# Patient Record
Sex: FEMALE | Race: OTHER | HISPANIC OR LATINO | Employment: STUDENT | URBAN - METROPOLITAN AREA
[De-identification: names, ages, dates, MRNs, and addresses within clinical notes are randomized per-mention and may not be internally consistent; named-entity substitution may affect disease eponyms.]

---
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
## 2024-01-11 ENCOUNTER — SOCIAL WORK (OUTPATIENT)
Dept: PSYCHIATRY | Facility: CLINIC | Age: 19
End: 2024-01-11
Payer: COMMERCIAL

## 2024-01-11 DIAGNOSIS — F41.1 GENERALIZED ANXIETY DISORDER: Primary | ICD-10-CM

## 2024-01-11 PROCEDURE — 90834 PSYTX W PT 45 MINUTES: CPT

## 2024-01-11 NOTE — PSYCH
Behavioral Health Psychotherapy Progress Note    Psychotherapy Provided: Individual Psychotherapy     1. Generalized anxiety disorder            Goals addressed in session: Goal 1     DATA: Monica's brother brought her to her session.She is animated and expressive. She ran out of her Sertraline for two weeks and had some very definite side effects. She has her medication refilled now and is taking it consistently. She has been at home for several weeks. Goes back to college on 1/21. The cleanliness and orderliness of the family house caused her a lot of anxiety. She talked to her mother about it but Monica doesn't think that she understood. Still upset about not getting the piano lessons she needs to excel at her [employer]. Discussed ways that she could speak to her professor about it. She got some C's this past semester. She admits that she didn't give her full effort. Discussed how she does the best she can with what she has. Needs to give herself some conchita. Talked about wishing she would have applied to more colleges. At the time she was afraid about even going to school and didn't think that she would have any other options. 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 Talked about getting another tattoo: it's pain that she can control. [employer] have her information to [employer] and now they are interested in doing a story about her. Scheduled monthly appointments.  During this session, this clinician used the following therapeutic modalities: Client-centered Therapy and Supportive Psychotherapy    Substance Abuse was not addressed during this session. If the client is diagnosed with a co-occurring substance use disorder, please indicate any changes in the frequency or amount of use: N/A. Stage of change for addressing substance use diagnoses: No substance use/Not  "applicable    ASSESSMENT:  Monica Townsend presents with a Anxious mood.     her affect is Normal range and intensity, which is congruent, with her mood and the content of the session. The client has made progress on their goals.     Monica Townsend presents with a none risk of suicide, none risk of self-harm, and none risk of harm to others.    For any risk assessment that surpasses a \"low\" rating, a safety plan must be developed.    A safety plan was indicated: no  If yes, describe in detail Monica mathewies current HI/SI/SIB    PLAN: Between sessions, Monica Townsend will continue to attend college and do her best. Await call from transplant coordinator. At the next session, the therapist will use Client-centered Therapy and Supportive Psychotherapy to address anxiety, living with a chronic illness and plans for her future.    Behavioral Health Treatment Plan and Discharge Planning: Monica Townsend is aware of and agrees to continue to work on their treatment plan. They have identified and are working toward their discharge goals. yes    Visit start and stop times:    01/11/24  Start Time: 1104  Stop Time: 1150  Total Visit Time: 46 minutes  "

## 2024-01-11 NOTE — BH TREATMENT PLAN
Outpatient Behavioral Health Psychotherapy Treatment Plan    Monicaciaran Townsend  2005     Date of Initial Psychotherapy Assessment: 5/11/2022   Date of Current Treatment Plan: 01/11/24  Treatment Plan Target Date: 7/11/2024  Treatment Plan Expiration Date: 7/11/2024    Diagnosis:   1. Generalized anxiety disorder            Area(s) of Need: self-esteem, anxiety, living with a chronic illness    Long Term Goal 1 (in the client's own words): be less anxious    Stage of Change: Contemplation    Target Date for completion: 7/11/2024     Anticipated therapeutic modalities: client centered, supportive and CBT     People identified to complete this goal: therapist and patient      Objective 1: (identify the means of measuring success in meeting the objective): Remain engaged in a therapeutic relationship      Objective 2: (identify the means of measuring success in meeting the objective): Continue to learn and utilize coping skills that can decrease her anxiety      Long Term Goal 2 (in the client's own words): accept my worth    Stage of Change: Contemplation    Target Date for completion: 7/11/2024     Anticipated therapeutic modalities: client centered, supportive and CBT     People identified to complete this goal: therapist and patient      Objective 1: (identify the means of measuring success in meeting the objective): Remain engaged in therapeutic relationship      Objective 2: (identify the means of measuring success in meeting the objective): Talk about and process her history and experiences that make her doubt her worth. Reframe to positive thoughts.      I am currently under the care of a [facility] psychiatric provider: yes    My [facility] psychiatric provider is: Dr. Echevarria    I am currently taking psychiatric medications: Yes, as prescribed    I feel that I will be ready for discharge from mental health care when I reach the following (measurable goal/objective): Have my kidney transplant, recover and  get back to school.    For children and adults who have a legal guardian:   Has there been any change to custody orders and/or guardianship status? No. If yes, attach updated documentation.    I have not yet created my Crisis Plan and will be offered a copy of this plan    Behavioral Health Treatment Plan [facility]: Diagnosis and Treatment Plan explained to Moncia Townsend acknowledges an understanding of their diagnosis. Monica Townsend agrees to this treatment plan.    I have been offered a copy of this Treatment Plan. yes

## 2024-01-29 DIAGNOSIS — I10 PRIMARY HYPERTENSION: ICD-10-CM

## 2024-01-29 DIAGNOSIS — N25.81 SECONDARY HYPERPARATHYROIDISM (HCC): Primary | ICD-10-CM

## 2024-01-29 RX ORDER — CALCITRIOL 0.25 UG/1
0.25 CAPSULE, LIQUID FILLED ORAL DAILY
Qty: 90 CAPSULE | Refills: 3 | Status: SHIPPED | OUTPATIENT
Start: 2024-01-29 | End: 2025-01-23

## 2024-01-29 RX ORDER — LOSARTAN POTASSIUM 25 MG/1
25 TABLET ORAL DAILY
Qty: 90 TABLET | Refills: 3 | Status: SHIPPED | OUTPATIENT
Start: 2024-01-29 | End: 2025-01-23

## 2024-02-01 ENCOUNTER — OFFICE VISIT (OUTPATIENT)
Dept: PSYCHIATRY | Facility: CLINIC | Age: 19
End: 2024-02-01

## 2024-02-01 DIAGNOSIS — F32.0 MDD (MAJOR DEPRESSIVE DISORDER), SINGLE EPISODE, MILD (HCC): ICD-10-CM

## 2024-02-01 DIAGNOSIS — F41.1 GENERALIZED ANXIETY DISORDER: ICD-10-CM

## 2024-02-01 RX ORDER — SERTRALINE HYDROCHLORIDE 100 MG/1
100 TABLET, FILM COATED ORAL DAILY
Qty: 90 TABLET | Refills: 3 | Status: SHIPPED | OUTPATIENT
Start: 2024-02-01

## 2024-02-01 NOTE — BH TREATMENT PLAN
TREATMENT PLAN (Medication Management Only)        [facility]    Name and Date of Birth:  Monica Towsnend 18 y.o. 2005  Date of Treatment Plan: February 1, 2024  Diagnosis/Diagnoses:    1. Generalized anxiety disorder    2. MDD (major depressive disorder), single episode, mild (HCC)      Strengths/Personal Resources for Self-Care: supportive family, supportive friends, taking medications as prescribed, ability to adapt to life changes, ability to communicate needs, ability to communicate well, ability to listen, ability to reason, ability to understand psychiatric illness, average or above intelligence.  Area/Areas of need (in own words): anxiety, depression  1. Long Term Goal: Feel calmer.  Target Date:6 months - 8/1/2024  Person/Persons responsible for completion of goal: Monica  2.  Short Term Objective (s) - How will we reach this goal?:   A. Provider new recommended medication/dosage changes and/or continue medication(s): continue current medications as prescribed.  B. N/A.  C. N/A.  Target Date:6 months - 8/1/2024  Person/Persons Responsible for Completion of Goal: Monica  Progress Towards Goals: continuing treatment  Treatment Modality: medication management every 6 months  Review due 180 days from date of this plan: 6 months - 8/1/2024  Expected length of service: ongoing treatment  My Physician/PA/NP and I have developed this plan together and I agree to work on the goals and objectives. I understand the treatment goals that were developed for my treatment.

## 2024-02-01 NOTE — PSYCH
MEDICATION MANAGEMENT NOTE        [facility]      Name and Date of Birth:  Monica Townsend 18 y.o. 2005 MRN: [id number]    Date of Visit: February 1, 2024    Reason for Visit: Follow-up for medication management.    Subjective: The patient reports she overall has been doing well since she last saw me in August.  Reports mood has been very stable and denies any depression or anxiety.  The patient reported in December she ran out of the medication and was not able to get the refill for 1 to 2 weeks and was without the meds.  At that time she reported having withdrawals including dizziness and increase in anxiety but once she got back on the medication she did well again.  She denies any concerns since that episode.  Reports her sleep, appetite, energy level and concentration has been fine.  Denies any hopelessness or SI or HI.  Denies any panic attacks lately.  Reports compliant with Zoloft and denies any side effect.  The patient still continues to do peritoneal dialysis every night.  Currently awaiting for kidney transplantation and is on the list.  She though denies any other medical issues.  Denies any other concerns today.  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      Review Of Systems:      Constitutional negative   ENT negative   Cardiovascular negative   Respiratory negative   Gastrointestinal negative   Genitourinary negative   Musculoskeletal negative   Integumentary negative   Neurological negative   Endocrine negative   Other Symptoms none       Alcohol/Substance Abuse: Denies.        Past Medical History:    Past Medical History:   Diagnosis Date    Alport syndrome     Asthma     Chronic kidney disease 01/2020    Encounter for routine child health examination without abnormal findings 2/20/2018    Hearing screen passed 2/20/2018    Lump of axilla, left 5/3/2018    Peritoneal dialysis catheter dysfunction, initial  encounter (HCC)         Past Surgical History:   Procedure Laterality Date    IR NEPHROSTOMY TO NEPHROURETERAL STENT  12/29/2021    IR PERITONEAL DIALYSIS CATHETER CHECK OR REPOSITION  1/12/2022    IR PERITONEAL DIALYSIS CATHETER CHECK OR REPOSITION  3/16/2022    IR PERITONEAL DIALYSIS CATHETER CHECK OR REPOSITION  6/1/2022    IR PERITONEAL DIALYSIS CATHETER CHECK OR REPOSITION  6/22/2022    IR PERITONEAL DIALYSIS CATHETER CHECK OR REPOSITION  12/6/2022    IR PERITONEAL DIALYSIS CATHETER PLACEMENT  12/10/2021    AZ LAPS W/REVISION INTRAPERITONEAL CATHETER N/A 1/19/2022    Procedure: INSERTION PERITONEAL CATHETER DIALYSIS LAPAROSCOPIC (manipulation of);  Surgeon: Josue Oliveira MD;  [facility];  Service: General    AZ RENAL BIOPSY PRQ TROCAR/NEEDLE Left 8/3/2020    Procedure: PERCUTANEOUS RENAL BIOPSY (US GUIDED);  Surgeon: Helder Osman MD;  Location: [facility];  Service: Urology    AZ RPR UMBILICAL HRNA 5 YRS/> REDUCIBLE N/A 1/19/2022    Procedure: REPAIR HERNIA UMBILICAL;  Surgeon: Josue Oliveira MD;  [facility];  Service: General    AZ UNLISTED PROCEDURE ABDOMEN PERITONEUM & OMENTUM N/A 3/19/2022    Procedure: REVISION/ UNROOFING PERITONEAL CATHETER DIALYSIS  LAPAROSCOPIC;  Surgeon: Denia King MD;  Location: [facility];  Service: General    US GUIDANCE  8/3/2020    WISDOM TOOTH EXTRACTION       Allergies   Allergen Reactions    Vancomycin Itching       Current Medications:       Current Outpatient Medications:     sertraline (ZOLOFT) 100 mg tablet, Take 1 tablet (100 mg total) by mouth daily, Disp: 90 tablet, Rfl: 3    acetaminophen (TYLENOL) 325 mg tablet, Take 650 mg by mouth every 6 (six) hours as needed for mild pain , Disp: , Rfl:     albuterol (PROVENTIL HFA,VENTOLIN HFA) 90 mcg/act inhaler, TAKE 2 PUFFS BY MOUTH EVERY 6 HOURS AS NEEDED FOR WHEEZE, Disp: 18 Inhaler, Rfl: 0    calcitriol (ROCALTROL) 0.25 mcg capsule, Take 1 capsule (0.25 mcg total) by mouth daily, Disp: 90  capsule, Rfl: 3    calcium carbonate (TUMS) 500 mg chewable tablet, Chew 1 tablet (500 mg total) 3 (three) times a day before meals, Disp: 90 tablet, Rfl: 5    clotrimazole-betamethasone (LOTRISONE) 1-0.05 % cream, APPLY TO AFFECTED AREA TWICE A DAY, Disp: 30 g, Rfl: 0    ferrous sulfate 324 (65 Fe) mg, Take 1 tablet (324 mg total) by mouth in the morning and 1 tablet (324 mg total) in the evening. Take before meals., Disp: 60 tablet, Rfl: 5    gentamicin (GARAMYCIN) 0.1 % cream, Apply topically daily Use as directed to PD site, Disp: 15 g, Rfl: 6    losartan (COZAAR) 25 mg tablet, Take 1 tablet (25 mg total) by mouth daily, Disp: 90 tablet, Rfl: 3    norelgestromin-ethinyl estradiol (Xulane) 150-35 MCG/24HR, Place 1 patch on skin. Change weekly for 3 weeks, then leave off for 1 week., Disp: 9 patch, Rfl: 3    ondansetron (ZOFRAN-ODT) 4 mg disintegrating tablet, Take 1 tablet (4 mg total) by mouth every 6 (six) hours as needed for nausea or vomiting, Disp: 30 tablet, Rfl: 1    polyethylene glycol (GLYCOLAX) 17 GM/SCOOP powder, Take by mouth, Disp: , Rfl:     sorbitol 70 % solution, Take 30 mL by mouth daily as needed (as needed for constipation and catheter dysfunction) for up to 10 days, Disp: 150 mL, Rfl: 1       History Review: The following portions of the patient's history were reviewed and updated as appropriate: allergies, current medications, past family history, past medical history, past social history, past surgical history, and problem list.         OBJECTIVE:     Vital signs in last 24 hours:    There were no vitals filed for this visit.    Mental Status Evaluation:    Appearance age appropriate, casually dressed   Behavior cooperative, calm   Speech normal rate, normal volume, normal pitch   Mood normal   Affect normal range and intensity, appropriate   Thought Processes organized, goal directed   Associations intact associations   Thought Content no overt delusions   Perceptual Disturbances: no  auditory hallucinations, no visual hallucinations   Abnormal Thoughts  Risk Potential Suicidal ideation - None  Homicidal ideation - None  Potential for aggression - No   Orientation oriented to person, place, time/date, and situation   Memory recent and remote memory grossly intact   Consciousness alert and awake   Attention Span Concentration Span attention span and concentration are age appropriate   Intellect appears to be of average intelligence   Insight intact   Judgement intact   Muscle Strength and  Gait normal gait and normal balance   Motor activity no abnormal movements   Language no difficulty naming common objects, no difficulty repeating a phrase   Fund of Knowledge adequate knowledge of current events  adequate fund of knowledge regarding past history  adequate fund of knowledge regarding vocabulary    Pain none   Pain Scale 0       Laboratory Results: Most Recent Labs:   Lab Results   Component Value Date    WBC 5.50 07/07/2023    RBC 3.65 (L) 07/07/2023    HGB 11.0 (L) 07/07/2023    HCT 34.4 (L) 07/07/2023     07/07/2023    RDW 12.4 07/07/2023    NEUTROABS 2.81 07/07/2023    SODIUM 136 06/28/2023    K 4.4 06/28/2023     06/28/2023    CO2 23 06/28/2023    BUN 74 (H) 06/28/2023    CREATININE 8.24 (H) 06/28/2023    GLUCOSE 86 01/19/2022    CALCIUM 9.1 06/28/2023    AST 5 06/25/2023    ALT 11 (L) 06/25/2023    ALKPHOS 108 06/25/2023    TP 6.8 06/25/2023    TBILI 0.37 06/25/2023    FDP9SEHBRESY 1.030 05/20/2021    FREET4 0.82 01/23/2018    PREGSERUM Negative 12/26/2022    HCGQUANT <2 06/19/2020     I have personally reviewed all pertinent laboratory/tests results.    Assessment/Plan: The patient overall doing well and denies any concern.  The plan is to continue with the current medication with no changes.  The patient educated about the Zoloft in detail and advised to call us if there is any concern and to call crisis, 911 or visit nearby ER in case of any emergency or having SI.  The  patient verbalized understanding agrees with the plan.  She will follow up with me in 6 to 8 months or sooner if needed.      Diagnoses and all orders for this visit:    Generalized anxiety disorder  -     sertraline (ZOLOFT) 100 mg tablet; Take 1 tablet (100 mg total) by mouth daily    MDD (major depressive disorder), single episode, mild (HCC)  -     sertraline (ZOLOFT) 100 mg tablet; Take 1 tablet (100 mg total) by mouth daily          Treatment Recommendations/Precautions:      Aware of 24 hour and weekend coverage for urgent situations accessed by calling [facility] main practice number    Risks/Benefits      Risks, Benefits And Possible Side Effects Of Medications:    Risks, benefits, and possible side effects of medications explained to Monica and she verbalizes understanding and agreement for treatment.  Risks of medications in pregnancy explained to Monica. She verbalizes understanding and agrees to notify her doctor if she becomes pregnant.    Controlled Medication Discussion:     Not applicable    Psychotherapy Provided:     Individual psychotherapy provided: Medications, treatment progress and treatment plan reviewed with Monica.  Medication education provided to Monica.  Reassurance and supportive therapy provided.   Crisis/safety plan discussed with Monica.     Treatment Plan;    Completed and signed during the session: Yes - with Monica Echevarria MD 02/01/24

## 2024-02-08 ENCOUNTER — TELEMEDICINE (OUTPATIENT)
Dept: PSYCHIATRY | Facility: CLINIC | Age: 19
End: 2024-02-08

## 2024-02-08 DIAGNOSIS — F41.1 GENERALIZED ANXIETY DISORDER: Primary | ICD-10-CM
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
## 2024-02-08 NOTE — PSYCH
Virtual Regular Visit    Verification of patient location:    Patient is located at Home in the following state in which I hold an active license [place]      Assessment/Plan:    Problem List Items Addressed This Visit       Generalized anxiety disorder - Primary       Goals addressed in session: Goal 1          Reason for visit is   Chief Complaint   Patient presents with    Virtual Regular Visit          Encounter provider Shannon Mancilla LCSW    Provider located at [facility] [address] [place] [address] [phone]      Recent Visits  No visits were found meeting these conditions.  Showing recent visits within past 7 days and meeting all other requirements  Today's Visits  Date Type Provider Dept   02/08/24 Telemedicine Shannon Mancilla LCSW [facility] [place]   Showing today's visits and meeting all other requirements  Future Appointments  No visits were found meeting these conditions.  Showing future appointments within next 150 days and meeting all other requirements       The patient was identified by name and date of birth. Monica Townsend was informed that this is a telemedicine visit and that the visit is being conducted throughthe Microsoft Teams platform. She agrees to proceed..  My office door was closed. No one else was in the room.  She acknowledged consent and understanding of privacy and security of the video platform. The patient has agreed to participate and understands they can discontinue the visit at any time.    Patient is aware this is a billable service.     Subjective  Monica Townsend is a 18 y.o. female.      HPI     Past Medical History:   Diagnosis Date    Alport syndrome     Asthma     Chronic kidney disease 01/2020    Encounter for routine child health examination without abnormal findings 2/20/2018    Hearing screen  passed 2/20/2018    Lump of axilla, left 5/3/2018    Peritoneal dialysis catheter dysfunction, initial encounter ([facility])        Past Surgical History:   Procedure Laterality Date    IR NEPHROSTOMY TO NEPHROURETERAL STENT  12/29/2021    IR PERITONEAL DIALYSIS CATHETER CHECK OR REPOSITION  1/12/2022    IR PERITONEAL DIALYSIS CATHETER CHECK OR REPOSITION  3/16/2022    IR PERITONEAL DIALYSIS CATHETER CHECK OR REPOSITION  6/1/2022    IR PERITONEAL DIALYSIS CATHETER CHECK OR REPOSITION  6/22/2022    IR PERITONEAL DIALYSIS CATHETER CHECK OR REPOSITION  12/6/2022    IR PERITONEAL DIALYSIS CATHETER PLACEMENT  12/10/2021    MI LAPS W/REVISION INTRAPERITONEAL CATHETER N/A 1/19/2022    Procedure: INSERTION PERITONEAL CATHETER DIALYSIS LAPAROSCOPIC (manipulation of);  Surgeon: Josue Oliveira MD;  [facility];  Service: General    MI RENAL BIOPSY PRQ TROCAR/NEEDLE Left 8/3/2020    Procedure: PERCUTANEOUS RENAL BIOPSY (US GUIDED);  Surgeon: Helder Osman MD;  Location: [facility];  Service: Urology    MI RPR UMBILICAL HRNA 5 YRS/> REDUCIBLE N/A 1/19/2022    Procedure: REPAIR HERNIA UMBILICAL;  Surgeon: Josue Oliveira MD;  [facility]: [facility];  Service: General    MI UNLISTED PROCEDURE ABDOMEN PERITONEUM & OMENTUM N/A 3/19/2022    Procedure: REVISION/ UNROOFING PERITONEAL CATHETER DIALYSIS  LAPAROSCOPIC;  Surgeon: Denia King MD;  Location: [facility];  Service: General    US GUIDANCE  8/3/2020    WISDOM TOOTH EXTRACTION         Current Outpatient Medications   Medication Sig Dispense Refill    acetaminophen (TYLENOL) 325 mg tablet Take 650 mg by mouth every 6 (six) hours as needed for mild pain       albuterol (PROVENTIL HFA,VENTOLIN HFA) 90 mcg/act inhaler TAKE 2 PUFFS BY MOUTH EVERY 6 HOURS AS NEEDED FOR WHEEZE 18 Inhaler 0    calcitriol (ROCALTROL) 0.25 mcg capsule Take 1 capsule (0.25 mcg total) by mouth daily 90 capsule 3    calcium carbonate (TUMS) 500 mg chewable tablet Chew 1 tablet (500 mg total) 3  (three) times a day before meals 90 tablet 5    clotrimazole-betamethasone (LOTRISONE) 1-0.05 % cream APPLY TO AFFECTED AREA TWICE A DAY 30 g 0    ferrous sulfate 324 (65 Fe) mg Take 1 tablet (324 mg total) by mouth in the morning and 1 tablet (324 mg total) in the evening. Take before meals. 60 tablet 5    gentamicin (GARAMYCIN) 0.1 % cream Apply topically daily Use as directed to PD site 15 g 6    losartan (COZAAR) 25 mg tablet Take 1 tablet (25 mg total) by mouth daily 90 tablet 3    norelgestromin-ethinyl estradiol (Xulane) 150-35 MCG/24HR Place 1 patch on skin. Change weekly for 3 weeks, then leave off for 1 week. 9 patch 3    ondansetron (ZOFRAN-ODT) 4 mg disintegrating tablet Take 1 tablet (4 mg total) by mouth every 6 (six) hours as needed for nausea or vomiting 30 tablet 1    polyethylene glycol (GLYCOLAX) 17 GM/SCOOP powder Take by mouth      sertraline (ZOLOFT) 100 mg tablet Take 1 tablet (100 mg total) by mouth daily 90 tablet 3    sorbitol 70 % solution Take 30 mL by mouth daily as needed (as needed for constipation and catheter dysfunction) for up to 10 days 150 mL 1     No current facility-administered medications for this visit.        Allergies   Allergen Reactions    Vancomycin Itching       Review of Systems    Video Exam    There were no vitals filed for this visit.    Physical Exam     Visit Time    Visit Start Time: 1108  Visit Stop Time: 1130  Total Visit Duration:  22 minutes

## 2024-03-07 ENCOUNTER — SOCIAL WORK (OUTPATIENT)
Dept: PSYCHIATRY | Facility: CLINIC | Age: 19
End: 2024-03-07
Payer: COMMERCIAL

## 2024-03-07 DIAGNOSIS — F41.1 GENERALIZED ANXIETY DISORDER: Primary | ICD-10-CM

## 2024-03-07 PROCEDURE — 90834 PSYTX W PT 45 MINUTES: CPT

## 2024-03-07 NOTE — PSYCH
"  Virtual Regular Visit    Verification of patient location:    Patient is located at Other in the following state in which I hold an active license [place]      Assessment/Plan:    Problem List Items Addressed This Visit       Generalized anxiety disorder - Primary       Goals addressed in session: Goal 1          Reason for visit is No chief complaint on file.       Encounter provider Shannon Mancilla LCSW    Provider located at [facility] [address] [place] [address] [phone]      Recent Visits  No visits were found meeting these conditions.  Showing recent visits within past 7 days and meeting all other requirements  Future Appointments  No visits were found meeting these conditions.  Showing future appointments within next 150 days and meeting all other requirements       The patient was identified by name and date of birth. Monica Townsend was informed that this is a telemedicine visit and that the visit is being conducted throughthe [employer] Teams platform. She agrees to proceed..  My office door was closed. No one else was in the room.  She acknowledged consent and understanding of privacy and security of the video platform. The patient has agreed to participate and understands they can discontinue the visit at any time.    Patient is aware this is a billable service.     Subjective  Monica Townsend is a 18 y.o. female.    Behavioral Health Psychotherapy Progress Note    Psychotherapy Provided: Individual Psychotherapy     1. Generalized anxiety disorder            Goals addressed in session: Goal 1     DATA: Monica apologized for \"ghosting\" therapist. She has been very busy with her classes. Her dialysis has been increased because she is urinating less. Takes more time. Still no word on transplant. Most of her classes are project based and are going well. Passed her piano jury. " "Has her mid-tier recital at the end of March for violin. Experiencing some normal roommate issues. Monica denies any recent panic attacks or being overwhelmed by anxiety. She did have an argument with one of her professors who reminds her of her . Monica was treated badly by the  and she sometimes has some trust issues with other people in that kind of position. She felt bad about the argument and has resolved it with the professor. Spring break is next week and it will good for her to be home. Her mother has started working outside the home which is a big deal. Monica has a class at noon; so our sessions are short. Trying to find another time that works better for her and tat therapist has available but no luck so far. Her schedule will change when she is home for the summer. Will see in one month. Monica has therapist's contact information and knows that she can reach out should needs arise.  During this session, this clinician used the following therapeutic modalities: Client-centered Therapy and Supportive Psychotherapy    Substance Abuse was not addressed during this session. If the client is diagnosed with a co-occurring substance use disorder, please indicate any changes in the frequency or amount of use: N/A. Stage of change for addressing substance use diagnoses: No substance use/Not applicable    ASSESSMENT:  Monica Townsend presents with a Euthymic/ normal mood.     her affect is Normal range and intensity, which is congruent, with her mood and the content of the session. The client has made progress on their goals.     Monica Townsend presents with a none risk of suicide, none risk of self-harm, and none risk of harm to others.    For any risk assessment that surpasses a \"low\" rating, a safety plan must be developed.    A safety plan was indicated: no  If yes, describe in detail Monica denies current SI/HI/SIB    PLAN: Between sessions, Monica Townsend " will continue to attend her college classes. Follow her dialysis plan of care. At the next session, the therapist will use Client-centered Therapy and Supportive Psychotherapy to address anxiety and plans for the future.    Behavioral Health Treatment Plan and Discharge Planning: Monica Townsend is aware of and agrees to continue to work on their treatment plan. They have identified and are working toward their discharge goals. yes    Visit start and stop times:    03/07/24     HPI     Past Medical History:   Diagnosis Date    Alport syndrome     Asthma     Chronic kidney disease 01/2020    Encounter for routine child health examination without abnormal findings 2/20/2018    Hearing screen passed 2/20/2018    Lump of axilla, left 5/3/2018    Peritoneal dialysis catheter dysfunction, initial encounter ([facility])        Past Surgical History:   Procedure Laterality Date    IR NEPHROSTOMY TO NEPHROURETERAL STENT  12/29/2021    IR PERITONEAL DIALYSIS CATHETER CHECK OR REPOSITION  1/12/2022    IR PERITONEAL DIALYSIS CATHETER CHECK OR REPOSITION  3/16/2022    IR PERITONEAL DIALYSIS CATHETER CHECK OR REPOSITION  6/1/2022    IR PERITONEAL DIALYSIS CATHETER CHECK OR REPOSITION  6/22/2022    IR PERITONEAL DIALYSIS CATHETER CHECK OR REPOSITION  12/6/2022    IR PERITONEAL DIALYSIS CATHETER PLACEMENT  12/10/2021    WA LAPS W/REVISION INTRAPERITONEAL CATHETER N/A 1/19/2022    Procedure: INSERTION PERITONEAL CATHETER DIALYSIS LAPAROSCOPIC (manipulation of);  Surgeon: Josue Oliveira MD;  [facility];  Service: General    WA RENAL BIOPSY PRQ TROCAR/NEEDLE Left 8/3/2020    Procedure: PERCUTANEOUS RENAL BIOPSY (US GUIDED);  Surgeon: Helder Osman MD;  [facility];  Service: Urology    WA RPR UMBILICAL HRNA 5 YRS/> REDUCIBLE N/A 1/19/2022    Procedure: REPAIR HERNIA UMBILICAL;  Surgeon: Josue Oliveira MD;  [facility];  Service: General    WA UNLISTED PROCEDURE ABDOMEN PERITONEUM & OMENTUM N/A 3/19/2022     Procedure: REVISION/ UNROOFING PERITONEAL CATHETER DIALYSIS  LAPAROSCOPIC;  Surgeon: Denia King MD;  Location: [facility];  Service: General    US GUIDANCE  8/3/2020    WISDOM TOOTH EXTRACTION         Current Outpatient Medications   Medication Sig Dispense Refill    acetaminophen (TYLENOL) 325 mg tablet Take 650 mg by mouth every 6 (six) hours as needed for mild pain       albuterol (PROVENTIL HFA,VENTOLIN HFA) 90 mcg/act inhaler TAKE 2 PUFFS BY MOUTH EVERY 6 HOURS AS NEEDED FOR WHEEZE 18 Inhaler 0    calcitriol (ROCALTROL) 0.25 mcg capsule Take 1 capsule (0.25 mcg total) by mouth daily 90 capsule 3    calcium carbonate (TUMS) 500 mg chewable tablet Chew 1 tablet (500 mg total) 3 (three) times a day before meals 90 tablet 5    clotrimazole-betamethasone (LOTRISONE) 1-0.05 % cream APPLY TO AFFECTED AREA TWICE A DAY 30 g 0    ferrous sulfate 324 (65 Fe) mg Take 1 tablet (324 mg total) by mouth in the morning and 1 tablet (324 mg total) in the evening. Take before meals. 60 tablet 5    gentamicin (GARAMYCIN) 0.1 % cream Apply topically daily Use as directed to PD site 15 g 6    losartan (COZAAR) 25 mg tablet Take 1 tablet (25 mg total) by mouth daily 90 tablet 3    norelgestromin-ethinyl estradiol (Xulane) 150-35 MCG/24HR Place 1 patch on skin. Change weekly for 3 weeks, then leave off for 1 week. 9 patch 3    ondansetron (ZOFRAN-ODT) 4 mg disintegrating tablet Take 1 tablet (4 mg total) by mouth every 6 (six) hours as needed for nausea or vomiting 30 tablet 1    polyethylene glycol (GLYCOLAX) 17 GM/SCOOP powder Take by mouth      sertraline (ZOLOFT) 100 mg tablet Take 1 tablet (100 mg total) by mouth daily 90 tablet 3    sorbitol 70 % solution Take 30 mL by mouth daily as needed (as needed for constipation and catheter dysfunction) for up to 10 days 150 mL 1     No current facility-administered medications for this visit.        Allergies   Allergen Reactions    Vancomycin Itching       Review of  Systems    Video Exam    There were no vitals filed for this visit.    Physical Exam     Visit Time    Visit Start Time: 1100  Visit Stop Time: 1138  Total Visit Duration:  38 minutes

## 2024-03-25 DIAGNOSIS — I10 PRIMARY HYPERTENSION: ICD-10-CM

## 2024-03-25 RX ORDER — LOSARTAN POTASSIUM 50 MG/1
50 TABLET ORAL DAILY
Qty: 90 TABLET | Refills: 3 | Status: SHIPPED | OUTPATIENT
Start: 2024-03-25 | End: 2025-03-20

## 2024-05-01 ENCOUNTER — SOCIAL WORK (OUTPATIENT)
Dept: PSYCHIATRY | Facility: CLINIC | Age: 19
End: 2024-05-01
Payer: COMMERCIAL

## 2024-05-01 DIAGNOSIS — F41.1 GENERALIZED ANXIETY DISORDER: Primary | ICD-10-CM

## 2024-05-01 PROCEDURE — 90837 PSYTX W PT 60 MINUTES: CPT

## 2024-05-01 NOTE — PSYCH
"Behavioral Health Psychotherapy Progress Note    Psychotherapy Provided: Individual Psychotherapy     1. Generalized anxiety disorder            Goals addressed in session: Goal 1 and Goal 2     DATA: This is the first session since Monica received her kidney transplant on March 29. She looks wonderful and is feeling great. Says that she feels brand new. 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 She and her four roommates have an off campus house together. Monica was able to get the [employer] and [employer] to work together and they are playing a collaborative concert this weekend. Monica will be playing with them. This was just a catchup session. Scheduled every other week during the summer. Will sukhi to work on her anxiety and adjusting to her new normal. Has some guilt about how much her medical care has cost her family. Will see in two weeks.  During this session, this clinician used the following therapeutic modalities: Client-centered Therapy and Supportive Psychotherapy    Substance Abuse was not addressed during this session. If the client is diagnosed with a co-occurring substance use disorder, please indicate any changes in the frequency or amount of use: N/A. Stage of change for addressing substance use diagnoses: No substance use/Not applicable    ASSESSMENT:  Monica Townsend presents with a Euthymic/ normal mood.     her affect is Normal range and intensity, which is congruent, with her mood and the content of the session. The client has made progress on their goals.     Monica Townsend presents with a none risk of suicide, none risk of self-harm, and none risk of harm to others.    For any risk assessment that surpasses a \"low\" rating, a safety plan must be developed.    A safety plan was indicated: no  If yes, describe in detail N/A    PLAN: Between sessions, Monica Townsend will be patient with herself and allow her body " to heal before rushing back into all of her activities. At the next session, the therapist will use Client-centered Therapy and Supportive Psychotherapy to address anxiety and adjusting to her new normal.    Behavioral Health Treatment Plan and Discharge Planning: Monica Townsend is aware of and agrees to continue to work on their treatment plan. They have identified and are working toward their discharge goals. yes    Visit start and stop times:    05/01/24  Start Time: 1110  Stop Time: 1204  Total Visit Time: 54 minutes

## 2024-05-08 ENCOUNTER — SOCIAL WORK (OUTPATIENT)
Dept: PSYCHIATRY | Facility: CLINIC | Age: 19
End: 2024-05-08
Payer: COMMERCIAL

## 2024-05-08 DIAGNOSIS — F41.1 GENERALIZED ANXIETY DISORDER: Primary | ICD-10-CM

## 2024-05-08 PROCEDURE — 90834 PSYTX W PT 45 MINUTES: CPT

## 2024-05-08 NOTE — PSYCH
Behavioral Health Psychotherapy Progress Note    Psychotherapy Provided: Individual Psychotherapy     1. Generalized anxiety disorder            Goals addressed in session: Goal 1 and Goal 2     DATA: Monica had a lot to share regarding a conversation she had with her father. It was the first time that they had a real adult conversation about what is going on in both of their lives. Both parents have shared with Monica that they have thought about leaving the marriage. Neither does because they are staying for the kids. Monica knew that her parents weren't in love with each other but it is hard to accept that they aren't happy and feel stuck. She has some guilt about costing her family money for college and with her illness. Therapist validated her feelings and explained that her parents wouldn't provide for her if they didn't want to. They want her to have the future that they didn't get to have. It's difficult when you realize that your parents are people with history, dreams and regrets. Monica knows it is a positive to be able to have these conversations with her parents. Her father was able to express that he is sorry when he misses some of her activities because he has to work. He even cried in front of her. Monica is also feeling the loss of independence and space moving home for the summer from college. She is going to return to work at [employer] on Monday. She needs to do something, earn money and be around people. She moved out of her college apartment yesterday and saw her roommates who expressed how much they miss her. Monica likes to feel missed, needed and appreciated. She has a math final and a conducting final to finish by the end of this week. Monica is feeling well physically, mentally and emotionally. Will see in one week.  During this session, this clinician used the following therapeutic modalities: Client-centered Therapy and Supportive Psychotherapy    Substance Abuse was not addressed  "during this session. If the client is diagnosed with a co-occurring substance use disorder, please indicate any changes in the frequency or amount of use: N/A. Stage of change for addressing substance use diagnoses: No substance use/Not applicable    ASSESSMENT:  Monica Townsend presents with a Anxious mood.     her affect is Normal range and intensity, which is congruent, with her mood and the content of the session. The client has made progress on their goals.     Monica Townsend presents with a none risk of suicide, none risk of self-harm, and none risk of harm to others.    For any risk assessment that surpasses a \"low\" rating, a safety plan must be developed.    A safety plan was indicated: no  If yes, describe in detail N/A    PLAN: Between sessions, Monica Townsend will continue to recover from her kidney transplant. Finish her school work and return to work next week. At the next session, the therapist will use Client-centered Therapy and Supportive Psychotherapy to address anxiety, recovery from kidney transplant and relationship with parents.    Behavioral Health Treatment Plan and Discharge Planning: Monica Townsend is aware of and agrees to continue to work on their treatment plan. They have identified and are working toward their discharge goals. yes    Visit start and stop times:    05/08/24  Start Time: 1205  Stop Time: 1250  Total Visit Time: 45 minutes  "

## 2024-05-16 ENCOUNTER — SOCIAL WORK (OUTPATIENT)
Dept: PSYCHIATRY | Facility: CLINIC | Age: 19
End: 2024-05-16
Payer: COMMERCIAL

## 2024-05-16 DIAGNOSIS — F41.1 GENERALIZED ANXIETY DISORDER: Primary | ICD-10-CM

## 2024-05-16 PROCEDURE — 90834 PSYTX W PT 45 MINUTES: CPT

## 2024-05-16 NOTE — PSYCH
"Behavioral Health Psychotherapy Progress Note    Psychotherapy Provided: Individual Psychotherapy     1. Generalized anxiety disorder            Goals addressed in session: Goal 1 and Goal 2     DATA: Monica arrived for her session and was in a good mood. She returned to work at [employer] and got a great reception from the staff and kids. The kids made her presents and had lots of questions about where she's been. 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 She is feeling great physically. She is sleeping well. She has gained some weight and had to get new clothes. Talked about having fun and reconnecting with some local high school friends. Monica is still getting used to accepting this new freedom and lack of worry. It is a big adjustment. Will see in two weeks.  During this session, this clinician used the following therapeutic modalities: Client-centered Therapy and Supportive Psychotherapy    Substance Abuse was not addressed during this session. If the client is diagnosed with a co-occurring substance use disorder, please indicate any changes in the frequency or amount of use: N/A. Stage of change for addressing substance use diagnoses: No substance use/Not applicable    ASSESSMENT:  Monica Townsend presents with a Euthymic/ normal mood.     her affect is Normal range and intensity, which is congruent, with her mood and the content of the session. The client has made progress on their goals.     Monica Townsend presents with a none risk of suicide, none risk of self-harm, and none risk of harm to others.    For any risk assessment that surpasses a \"low\" rating, a safety plan must be developed.    A safety plan was indicated: no  If yes, describe in detail Monica denies current SI/HI/SIB    PLAN: Between sessions, Monica Townsend will continue to recover from " kidney transplant. Get proper rest and nutrition. Have some fun. At the next session, the therapist will use Client-centered Therapy and Supportive Psychotherapy to address anxiety and adjusting to a life without dialysis.    Behavioral Health Treatment Plan and Discharge Planning: Monica Townsend is aware of and agrees to continue to work on their treatment plan. They have identified and are working toward their discharge goals. yes    Visit start and stop times:    05/16/24  Start Time: 1100  Stop Time: 1148  Total Visit Time: 48 minutes

## 2024-05-30 ENCOUNTER — SOCIAL WORK (OUTPATIENT)
Dept: PSYCHIATRY | Facility: CLINIC | Age: 19
End: 2024-05-30
Payer: COMMERCIAL

## 2024-05-30 DIAGNOSIS — F41.1 GENERALIZED ANXIETY DISORDER: Primary | ICD-10-CM

## 2024-05-30 PROCEDURE — 90834 PSYTX W PT 45 MINUTES: CPT

## 2024-05-30 NOTE — PSYCH
Behavioral Health Psychotherapy Progress Note    Psychotherapy Provided: Individual Psychotherapy     1. Generalized anxiety disorder            Goals addressed in session: Goal 1 and Goal 2     DATA: Monica arrived a little late because she was on her way back from an appointment at [facility]. She had an ultrasound of her kidney and everything is going well. Some of her medication levels are off. She has been having some vomiting and diarrhea for the past week. Doctors are monitoring. She will continue to go to [facility] twice/week for lab work. She has been tired and sleeping a lot. Doctors say that is normal. She has been having some anxiety about all of this; thinking that she may have done something wrong to mess up her kidney. Talked through the worries. She has been following her after care and has done nothing wrong. She finished her field observations. She still needs to finish her composition class. She has all summer. [employer] concert went well. 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 She really enjoys it and is paid well. Discussed the difficulty of adjusting to being home again and not being able to come and go as she pleases. Discussed having a conversation with her parents about what the rules are for curfew, going out, etc. Monica is doing a great job of adjusting to all the recent changes in her life: kidney transplant, new medication regimen, being at home again, etc. Will see in two weeks.  During this session, this clinician used the following therapeutic modalities: Client-centered Therapy and Supportive Psychotherapy    Substance Abuse was not addressed during this session. If the client is diagnosed with a co-occurring substance use disorder, please indicate any changes in the frequency or amount of use: N/A. Stage of change for addressing substance use diagnoses: No substance use/Not applicable    ASSESSMENT:  Monica Townsend presents  "with a Euthymic/ normal mood.     her affect is Normal range and intensity, which is congruent, with her mood and the content of the session. The client has made progress on their goals.     Monica Townsend presents with a none risk of suicide, none risk of self-harm, and none risk of harm to others.    For any risk assessment that surpasses a \"low\" rating, a safety plan must be developed.    A safety plan was indicated: no  If yes, describe in detail Monica denies current SI/HI/SIB    PLAN: Between sessions, Monica Townsend will continue to follow her medical protocol to maintain her kidney transplant. Talk to her parents about appropriate curfew and rules for while she is home from college. At the next session, the therapist will use Client-centered Therapy and Supportive Psychotherapy to address anxiety, life with her new kidney and adjusting to being back at home.    Behavioral Health Treatment Plan and Discharge Planning: Monica Townsend is aware of and agrees to continue to work on their treatment plan. They have identified and are working toward their discharge goals. yes    Visit start and stop times:    05/30/24  Start Time: 1108  Stop Time: 1154  Total Visit Time: 46 minutes  "

## 2024-06-13 ENCOUNTER — SOCIAL WORK (OUTPATIENT)
Dept: PSYCHIATRY | Facility: CLINIC | Age: 19
End: 2024-06-13
Payer: COMMERCIAL

## 2024-06-13 DIAGNOSIS — F41.1 GENERALIZED ANXIETY DISORDER: Primary | ICD-10-CM

## 2024-06-13 PROCEDURE — 90837 PSYTX W PT 60 MINUTES: CPT

## 2024-06-13 NOTE — BH TREATMENT PLAN
Outpatient Behavioral Health Psychotherapy Treatment Plan     Monica E Townsend  2005      Date of Initial Psychotherapy Assessment: 5/11/2022   Date of Current Treatment Plan: 6/13/2024  Treatment Plan Target Date: 12/13/2024  Treatment Plan Expiration Date: 12/13/2024     Diagnosis:   1. Generalized anxiety disorder                Area(s) of Need: self-esteem, anxiety, living with a chronic illness     Long Term Goal 1 (in the client's own words): be less anxious     Stage of Change: Contemplation     Target Date for completion: 12/13/2024             Anticipated therapeutic modalities: client centered, supportive and CBT             People identified to complete this goal: therapist and patient                    Objective 1: (identify the means of measuring success in meeting the objective): Remain engaged in a therapeutic relationship                    Objective 2: (identify the means of measuring success in meeting the objective): Continue to learn and utilize coping skills that can decrease her anxiety      Long Term Goal 2 (in the client's own words): accept my worth     Stage of Change: Contemplation     Target Date for completion: 12/13/2024             Anticipated therapeutic modalities: client centered, supportive and CBT             People identified to complete this goal: therapist and patient                    Objective 1: (identify the means of measuring success in meeting the objective): Remain engaged in therapeutic relationship                    Objective 2: (identify the means of measuring success in meeting the objective): Talk about and process her history and experiences that make her doubt her worth. Reframe to positive thoughts.      Long Term Goal 3 (in the client's own words): adjust to life without dialysis     Stage of Change: Contemplation     Target Date for completion: 12/13/2024             Anticipated therapeutic modalities: client centered, supportive and CBT              People identified to complete this goal: therapist and patient                    Objective 1: (identify the means of measuring success in meeting the objective): Remain engaged in therapeutic relationship                    Objective 2: (identify the means of measuring success in meeting the objective): Talk about and process her medical history. Accept her real talents/gifts and that she doesn't and can't use her dialysis as an excuse.    I am currently under the care of a [facility] psychiatric provider: yes     My [facility] psychiatric provider is: Dr. Echevarria     I am currently taking psychiatric medications: Yes, as prescribed     I feel that I will be ready for discharge from mental health care when I reach the following (measurable goal/objective): Have my kidney transplant, recover, get back to school and feel confident in myself     For children and adults who have a legal guardian:          Has there been any change to custody orders and/or guardianship status? No. If yes, attach updated documentation.     I have not yet created my Crisis Plan and will be offered a copy of this plan     Behavioral Health Treatment Plan  Luke: Diagnosis and Treatment Plan explained to Monica Townsend acknowledges an understanding of their diagnosis. Monica Townsend agrees to this treatment plan.     I have been offered a copy of this Treatment Plan. yes

## 2024-06-13 NOTE — PSYCH
Behavioral Health Psychotherapy Progress Note    Psychotherapy Provided: Individual Psychotherapy     1. Generalized anxiety disorder            Goals addressed in session: Goal 1, Goal 2, and Goal 3      DATA: Monica looks great and is feeling well. She eats 1-2 meals/day and drinks plenty of water. 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 She is trying to earn money to help pay for school. Meeting a friend, Franklyn, today for pizza. She is scared about returning to school because she can no longer blame things on her disease or dialysis. Has to rely on herself. She is frustrated with where she is musically and playing the violin. She misses playing the violin but she doesn't have the motivation to do so. Discussed that she isn't even three months out for the transplant and she needs to be kind to herself. Set a date of July 1 to really start getting back into her practicing and getting ready for school. She has plans with her roommates to go to the beach. Things at home are OK. Family is struggling financially. Older brother helps out with the bills. Monica is doing really well and will need to take time to adjust to her new life. Will see in two weeks.  During this session, this clinician used the following therapeutic modalities: Client-centered Therapy and Supportive Psychotherapy    Substance Abuse was not addressed during this session. If the client is diagnosed with a co-occurring substance use disorder, please indicate any changes in the frequency or amount of use: N/A. Stage of change for addressing substance use diagnoses: No substance use/Not applicable    ASSESSMENT:  Monica Townsend presents with a Euthymic/ normal mood.     her affect is Normal range and intensity, which is congruent, with her mood and the content of the session. The client has made progress on their goals.     Monica Townsend  "presents with a none risk of suicide, none risk of self-harm, and none risk of harm to others.    For any risk assessment that surpasses a \"low\" rating, a safety plan must be developed.    A safety plan was indicated: no  If yes, describe in detail Monica banda current SI/HI/SIB    PLAN: Between sessions, Monica Townsend will continue to recover and adjust to life with her kidney transplant and no dialysis. At the next session, the therapist will use Client-centered Therapy, Cognitive Behavioral Therapy, and Supportive Psychotherapy to address anxiety, financial worries and concern about no longer being able to use her disease/dialysis as an excuse..    Behavioral Health Treatment Plan and Discharge Planning: Monica Townsend is aware of and agrees to continue to work on their treatment plan. They have identified and are working toward their discharge goals. yes    Visit start and stop times:    06/13/24  Start Time: 1458  Stop Time: 1551  Total Visit Time: 53 minutes  "

## 2024-06-25 DIAGNOSIS — N93.9 ABNORMAL UTERINE BLEEDING (AUB): ICD-10-CM

## 2024-06-25 RX ORDER — NORELGESTROMIN AND ETHINYL ESTRADIOL 150; 35 UG/D; UG/D
PATCH TRANSDERMAL
Qty: 4 PATCH | Refills: 0 | Status: SHIPPED | OUTPATIENT
Start: 2024-06-25

## 2024-07-08 ENCOUNTER — SOCIAL WORK (OUTPATIENT)
Dept: PSYCHIATRY | Facility: CLINIC | Age: 19
End: 2024-07-08
Payer: COMMERCIAL

## 2024-07-08 DIAGNOSIS — F41.1 GENERALIZED ANXIETY DISORDER: Primary | ICD-10-CM

## 2024-07-08 PROCEDURE — 90834 PSYTX W PT 45 MINUTES: CPT

## 2024-07-08 NOTE — PSYCH
"Behavioral Health Psychotherapy Progress Note    Psychotherapy Provided: Individual Psychotherapy     1. Generalized anxiety disorder            Goals addressed in session: Goal 1 and Goal 2     DATA: Monica drove herself to session after she finished work. She is feeling physically well. Work is going well. Monica expressed some anger about how her parents handle her emotions or don't handle them. Her father constantly talks about finances are tight and Monica takes it upon herself to give up things that cost money but no one asked her to give up; I.e. like going to visit a friend for the day. Her father always ends his statements with \"if God allows\". This statement makes Monica furious because by that thought pattern God allowed her kidneys to fail and for her to miss school and be in pain and have to wait two years for a transplant. She just wants her father to listen to her and give her a yes or no answer without the Anabaptist/spiritual extras. Monica reports that she has her first cry since her transplant. She said that she cried for a long time and it felt good. Therapist was glad she had that experience. She has been through a lot and it needs to come out. Her mother doesn't listen to her when she talks about how she feels. She makes it seem less important and/or turns it to talk about herself. Even her kidney disease and transplant gets turned around to how difficult it was for them to watch her go through it. Discussed the difficulty of transitioning from college to home for the summer and back to college. She misses her friends. She will be going to the beach with them for the weekend in a few weeks. Therapist suggested that she try writing in a journal again to get these feelings out. Monica was open to that. Will see in two weeks.  During this session, this clinician used the following therapeutic modalities: Client-centered Therapy and Supportive Psychotherapy    Substance Abuse was not addressed " "during this session. If the client is diagnosed with a co-occurring substance use disorder, please indicate any changes in the frequency or amount of use: N/A. Stage of change for addressing substance use diagnoses: No substance use/Not applicable    ASSESSMENT:  Monica Townsend presents with a Euthymic/ normal mood.     her affect is Normal range and intensity, which is congruent, with her mood and the content of the session. The client has made progress on their goals.     Monica Townsend presents with a none risk of suicide, none risk of self-harm, and none risk of harm to others.    For any risk assessment that surpasses a \"low\" rating, a safety plan must be developed.    A safety plan was indicated: no  If yes, describe in detail Monica denies current SI/HI/SIB    PLAN: Between sessions, Monica Townsend will continue to follow her post transplant medical plan. Write her feelings in a journal to continue processing them. At the next session, the therapist will use Client-centered Therapy and Supportive Psychotherapy to address anxiety, relationship with her parents and post transplant life.    Behavioral Health Treatment Plan and Discharge Planning: Monica Townsend is aware of and agrees to continue to work on their treatment plan. They have identified and are working toward their discharge goals. yes    Visit start and stop times:    07/08/24  Start Time: 1400  Stop Time: 1450  Total Visit Time: 50 minutes  " Patient

## 2024-07-11 ENCOUNTER — APPOINTMENT (OUTPATIENT)
Dept: LAB | Facility: HOSPITAL | Age: 19
End: 2024-07-11
Payer: COMMERCIAL

## 2024-07-11 DIAGNOSIS — Z94.0 KIDNEY REPLACED BY TRANSPLANT: ICD-10-CM

## 2024-07-11 LAB
ANION GAP SERPL CALCULATED.3IONS-SCNC: 8 MMOL/L (ref 4–13)
BUN SERPL-MCNC: 19 MG/DL (ref 5–25)
CALCIUM SERPL-MCNC: 9.8 MG/DL (ref 8.4–10.2)
CHLORIDE SERPL-SCNC: 106 MMOL/L (ref 96–108)
CO2 SERPL-SCNC: 22 MMOL/L (ref 21–32)
CREAT SERPL-MCNC: 0.74 MG/DL (ref 0.6–1.3)
GFR SERPL CREATININE-BSD FRML MDRD: 116 ML/MIN/1.73SQ M
GLUCOSE SERPL-MCNC: 88 MG/DL (ref 65–140)
POTASSIUM SERPL-SCNC: 4 MMOL/L (ref 3.5–5.3)
SODIUM SERPL-SCNC: 136 MMOL/L (ref 135–147)
TACROLIMUS BLD-MCNC: 10.8 NG/ML (ref 3–15)

## 2024-07-11 PROCEDURE — 80197 ASSAY OF TACROLIMUS: CPT

## 2024-07-11 PROCEDURE — 36415 COLL VENOUS BLD VENIPUNCTURE: CPT

## 2024-07-11 PROCEDURE — 80048 BASIC METABOLIC PNL TOTAL CA: CPT

## 2024-07-15 DIAGNOSIS — N93.9 ABNORMAL UTERINE BLEEDING (AUB): ICD-10-CM

## 2024-07-16 RX ORDER — NORELGESTROMIN AND ETHINYL ESTRADIOL 150; 35 UG/D; UG/D
PATCH TRANSDERMAL
Qty: 9 PATCH | Refills: 0 | Status: SHIPPED | OUTPATIENT
Start: 2024-07-16

## 2024-07-25 ENCOUNTER — TELEMEDICINE (OUTPATIENT)
Dept: PSYCHIATRY | Facility: CLINIC | Age: 19
End: 2024-07-25
Payer: COMMERCIAL

## 2024-07-25 DIAGNOSIS — F41.1 GENERALIZED ANXIETY DISORDER: Primary | ICD-10-CM

## 2024-07-25 DIAGNOSIS — F32.0 MDD (MAJOR DEPRESSIVE DISORDER), SINGLE EPISODE, MILD (HCC): ICD-10-CM

## 2024-07-25 PROCEDURE — 90834 PSYTX W PT 45 MINUTES: CPT

## 2024-07-25 NOTE — PSYCH
Virtual Regular Visit    Verification of patient location:    Patient is located at Home in the following state in which I hold an active license [place]      Assessment/Plan:    Problem List Items Addressed This Visit       Generalized anxiety disorder - Primary    MDD (major depressive disorder), single episode, mild (HCC)       Goals addressed in session: Goal 1, Goal 2, and Goal 3           Reason for visit is No chief complaint on file.       Encounter provider Shannon Mancilla LCSW      Recent Visits  No visits were found meeting these conditions.  Showing recent visits within past 7 days and meeting all other requirements  Today's Visits  Date Type Provider Dept   07/25/24 Telemedicine Shannon Mancilla LCSW [facility]   Showing today's visits and meeting all other requirements  Future Appointments  No visits were found meeting these conditions.  Showing future appointments within next 150 days and meeting all other requirements       The patient was identified by name and date of birth. Monica Townsend was informed that this is a telemedicine visit and that the visit is being conducted throughthe Epic Embedded platform. She agrees to proceed..  My office door was closed. No one else was in the room.  She acknowledged consent and understanding of privacy and security of the video platform. The patient has agreed to participate and understands they can discontinue the visit at any time.    Patient is aware this is a billable service.     Subjective  Monica Townsend is a 19 y.o. female.    Behavioral Health Psychotherapy Progress Note    Psychotherapy Provided: Individual Psychotherapy     1. Generalized anxiety disorder        2. MDD (major depressive disorder), single episode, mild (HCC)            Goals addressed in session: Goal 1, Goal 2, and Goal 3      DATA: Monica was on time for her session. Needed to do virtual because her mother was using the car  today. Monica looks great. She got a new hairstyle. Says that she is feeling well. Sleeping well. Eating well. Knows that she needs to drink more water. Gets tired but makes a point to listen to her body and go to bed when she feels that way. She has been working for [employer] a lot and plans to continue working during the school year. She is excited and nervous about returning to college. Excited because she won't have to worry about dialysis and nervous because she still has stuff to finish from leaving last semester early due to her transplant. 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 She won't have brain fog this year and will be able to concentrate on her classes. She is going to the beach with friends this weekend. Discussed whether or not she can drink alcohol or smoke marijuana since transplant. Neither are recommended. Monica doesn't plan on it but asked her doctors about it to make sure. Mother has been confiding in her a lot about the state of her marriage with Monica's father. Monica doesn't want to know this information but feels like her mother has no one else to talk to.  Therapist discussed that she is the child in this situation and shouldn't be forced to be in the middle. Monica is still working on healthy boundaries,setting expectations for herself and others and recognizing her self worth. She would like to continue to meet monthly once she returns to school. Will see in two weeks.  During this session, this clinician used the following therapeutic modalities: Client-centered Therapy and Supportive Psychotherapy    Substance Abuse was addressed during this session. If the client is diagnosed with a co-occurring substance use disorder, please indicate any changes in the frequency or amount of use: discussed using marijuana or alcohol post kidney transplant. Stage of change for addressing substance use diagnoses: No substance use/Not applicable    ASSESSMENT:  Monica DE LA GARZA  "Kristian presents with a Euthymic/ normal mood.     her affect is Normal range and intensity, which is congruent, with her mood and the content of the session. The client has made progress on their goals.     Monica Townsend presents with a none risk of suicide, none risk of self-harm, and none risk of harm to others.    For any risk assessment that surpasses a \"low\" rating, a safety plan must be developed.    A safety plan was indicated: no  If yes, describe in detail Monica banda current SI/HI/SIB    PLAN: Between sessions, Monica Townsend will start to prepare to return to college without dialysis. Remember that her parents' problems are not hers to solve. At the next session, the therapist will use Client-centered Therapy and Supportive Psychotherapy to address anxiety/depression and transitioning back to college post transplant.    Behavioral Health Treatment Plan and Discharge Planning: Monica Townsend is aware of and agrees to continue to work on their treatment plan. They have identified and are working toward their discharge goals. yes    Visit start and stop times:    07/25/24  Start Time: 1500  Stop Time: 1540  Total Visit Time: 40 minutes  HPI     Past Medical History:   Diagnosis Date    Alport syndrome     Asthma     Chronic kidney disease 01/2020    Encounter for routine child health examination without abnormal findings 2/20/2018    Hearing screen passed 2/20/2018    Lump of axilla, left 5/3/2018    Peritoneal dialysis catheter dysfunction, initial encounter ([facility])        Past Surgical History:   Procedure Laterality Date    IR NEPHROSTOMY TO NEPHROURETERAL STENT  12/29/2021    IR PERITONEAL DIALYSIS CATHETER CHECK OR REPOSITION  1/12/2022    IR PERITONEAL DIALYSIS CATHETER CHECK OR REPOSITION  3/16/2022    IR PERITONEAL DIALYSIS CATHETER CHECK OR REPOSITION  6/1/2022    IR PERITONEAL DIALYSIS CATHETER CHECK OR REPOSITION  6/22/2022    IR PERITONEAL DIALYSIS CATHETER CHECK OR REPOSITION  " 12/6/2022    IR PERITONEAL DIALYSIS CATHETER PLACEMENT  12/10/2021    MA LAPS W/REVISION INTRAPERITONEAL CATHETER N/A 1/19/2022    Procedure: INSERTION PERITONEAL CATHETER DIALYSIS LAPAROSCOPIC (manipulation of);  Surgeon: Josue Oliveira MD;  [facility];  Service: General    MA RENAL BIOPSY PRQ TROCAR/NEEDLE Left 8/3/2020    Procedure: PERCUTANEOUS RENAL BIOPSY (US GUIDED);  Surgeon: Helder Osman MD;  [facility];  Service: Urology    MA RPR UMBILICAL HRNA 5 YRS/> REDUCIBLE N/A 1/19/2022    Procedure: REPAIR HERNIA UMBILICAL;  Surgeon: Josue Oliveira MD;  [facility];  Service: General    MA UNLISTED PROCEDURE ABDOMEN PERITONEUM & OMENTUM N/A 3/19/2022    Procedure: REVISION/ UNROOFING PERITONEAL CATHETER DIALYSIS  LAPAROSCOPIC;  Surgeon: Denia King MD;  [facility];  Service: General    US GUIDANCE  8/3/2020    WISDOM TOOTH EXTRACTION         Current Outpatient Medications   Medication Sig Dispense Refill    acetaminophen (TYLENOL) 325 mg tablet Take 650 mg by mouth every 6 (six) hours as needed for mild pain       albuterol (PROVENTIL HFA,VENTOLIN HFA) 90 mcg/act inhaler TAKE 2 PUFFS BY MOUTH EVERY 6 HOURS AS NEEDED FOR WHEEZE 18 Inhaler 0    calcitriol (ROCALTROL) 0.25 mcg capsule Take 1 capsule (0.25 mcg total) by mouth daily 90 capsule 3    calcium carbonate (TUMS) 500 mg chewable tablet Chew 1 tablet (500 mg total) 3 (three) times a day before meals 90 tablet 5    clotrimazole-betamethasone (LOTRISONE) 1-0.05 % cream APPLY TO AFFECTED AREA TWICE A DAY 30 g 0    ferrous sulfate 324 (65 Fe) mg Take 1 tablet (324 mg total) by mouth in the morning and 1 tablet (324 mg total) in the evening. Take before meals. 60 tablet 5    gentamicin (GARAMYCIN) 0.1 % cream Apply topically daily Use as directed to PD site 15 g 6    losartan (COZAAR) 50 mg tablet Take 1 tablet (50 mg total) by mouth daily 90 tablet 3    norelgestromin-ethinyl estradiol (Xulane) 150-35 MCG/24HR  PLACE 1 PATCH ON SKIN. CHANGE WEEKLY FOR 3 WEEKS, THEN LEAVE OFF FOR 1 WEEK. 9 patch 0    ondansetron (ZOFRAN-ODT) 4 mg disintegrating tablet Take 1 tablet (4 mg total) by mouth every 6 (six) hours as needed for nausea or vomiting 30 tablet 1    polyethylene glycol (GLYCOLAX) 17 GM/SCOOP powder Take by mouth      sertraline (ZOLOFT) 100 mg tablet Take 1 tablet (100 mg total) by mouth daily 90 tablet 3    sorbitol 70 % solution Take 30 mL by mouth daily as needed (as needed for constipation and catheter dysfunction) for up to 10 days 150 mL 1     No current facility-administered medications for this visit.        Allergies   Allergen Reactions    Vancomycin Itching       Review of Systems    Video Exam    There were no vitals filed for this visit.    Physical Exam     Visit Time    Visit Start Time: 1500  Visit Stop Time: 1540  Total Visit Duration:  40 minutes

## 2024-08-15 ENCOUNTER — SOCIAL WORK (OUTPATIENT)
Dept: PSYCHIATRY | Facility: CLINIC | Age: 19
End: 2024-08-15
Payer: COMMERCIAL

## 2024-08-15 DIAGNOSIS — F41.1 GENERALIZED ANXIETY DISORDER: ICD-10-CM

## 2024-08-15 DIAGNOSIS — F32.0 MDD (MAJOR DEPRESSIVE DISORDER), SINGLE EPISODE, MILD (HCC): Primary | ICD-10-CM

## 2024-08-15 PROCEDURE — 90834 PSYTX W PT 45 MINUTES: CPT

## 2024-08-15 NOTE — PSYCH
Behavioral Health Psychotherapy Progress Note    Psychotherapy Provided: Individual Psychotherapy     1. MDD (major depressive disorder), single episode, mild (HCC)        2. Generalized anxiety disorder            Goals addressed in session: Goal 1, Goal 2, and Goal 3      DATA: Monica was angry and anxious when she arrived. She ws very tearful. Her siblings left for [facility] and she has been at home with her parents alone. Her parents fight constantly to the point of becoming aggressive. Her father is not available to her nor is he involved in her life. She has asked him to help with moving back to college and he won't give her a definite answer. She moves in on 8/19 and starts classes on 8/26. She can't wait to get back to school. Her mother invalidates her feelings about her father because her mother is just trying to survive her life and her marriage. Monica desperately wants people, especially her father, to show up for her. She watches her friends and their relationships with their parents and realizes how different it is. She is also feeling the cultural differences keenly. Therapist validated her experience and feelings. Scheduled virtual sessions for the first semester of college.  During this session, this clinician used the following therapeutic modalities: Client-centered Therapy and Supportive Psychotherapy    Substance Abuse was not addressed during this session. If the client is diagnosed with a co-occurring substance use disorder, please indicate any changes in the frequency or amount of use: N/A. Stage of change for addressing substance use diagnoses: No substance use/Not applicable    ASSESSMENT:  Monica Townsend presents with a Angry and Anxious mood.     her affect is Tearful, which is congruent, with her mood and the content of the session. The client has made progress on their goals.     Monica Townsend presents with a none risk of suicide, none risk of self-harm, and none risk of  "harm to others.    For any risk assessment that surpasses a \"low\" rating, a safety plan must be developed.    A safety plan was indicated: no  If yes, describe in detail Monica denies current SI/HI/SIB    PLAN: Between sessions, Monica Townsend will return to college and focus on her studies and friends. Continue to set and adhere to boundaries she establishes with her parents. At the next session, the therapist will use Client-centered Therapy and Supportive Psychotherapy to address depression, anxiety, relationship with parents, living post transplant and transition back to college.    Behavioral Health Treatment Plan and Discharge Planning: Monica Townsend is aware of and agrees to continue to work on their treatment plan. They have identified and are working toward their discharge goals. yes    Visit start and stop times:    08/15/24  Start Time: 1500  Stop Time: 1548  Total Visit Time: 48 minutes  "

## 2024-08-21 ENCOUNTER — SOCIAL WORK (OUTPATIENT)
Dept: PSYCHIATRY | Facility: CLINIC | Age: 19
End: 2024-08-21
Payer: COMMERCIAL

## 2024-08-21 DIAGNOSIS — F41.1 GENERALIZED ANXIETY DISORDER: Primary | ICD-10-CM

## 2024-08-21 PROCEDURE — 90834 PSYTX W PT 45 MINUTES: CPT

## 2024-08-21 NOTE — PSYCH
"Behavioral Health Psychotherapy Progress Note    Psychotherapy Provided: Individual Psychotherapy     1. Generalized anxiety disorder            Goals addressed in session: Goal 1 and Goal 2     DATA: Monica moved all of her stuff to her drom room on Monday and will go to stay tomorrow. Her mother and father showed up to help (3 hours late). Discussed the fact that she was strong enough this year to move the stuff herself. Her father apologized to her for not paying attention to her or knowing what was going on with her life. Her mother had spoken with him. She told her father that she doesn't want to hear \"if God allows' anymore. She wants a yes/no answer when she asks him things. She turned away from Jainism when she was diagnosed with Alport Syndrome and started her kidney troubles. Everyone told her that she would be healed if she turned to God. She grew up going to J[employer]ism and [employer] school and God still allowed her to become sick. Discussed that you don't have to attend a Jainism to have a relationship with God. Monica found out that she qualifies for disability and will get $400/month. Will get back pay to at least April 2023. Discussed how there are NO excuses this year at school. She doesn't have dialysis or brain fog or fatigue to fall back on. All the rewards and consequences are dependent upon her effort. She is equally excited and nervous. Have scheduled virtual appointments for during the school year. Will see in two weeks.  During this session, this clinician used the following therapeutic modalities: Client-centered Therapy and Supportive Psychotherapy    Substance Abuse was not addressed during this session. If the client is diagnosed with a co-occurring substance use disorder, please indicate any changes in the frequency or amount of use: N/A. Stage of change for addressing substance use diagnoses: No substance use/Not applicable    ASSESSMENT:  Monica Townsend presents with a Euthymic/ normal " "mood.     her affect is Normal range and intensity, which is congruent, with her mood and the content of the session. The client has made progress on their goals.     Monica Townsend presents with a none risk of suicide, none risk of self-harm, and none risk of harm to others.    For any risk assessment that surpasses a \"low\" rating, a safety plan must be developed.    A safety plan was indicated: no  If yes, describe in detail Monica denies current SI/HI/SIB    PLAN: Between sessions, Monica Townsend will return to college for her cary year post kidney transplant. At the next session, the therapist will use Client-centered Therapy and Supportive Psychotherapy to address anxiety and transition back to college.    Behavioral Health Treatment Plan and Discharge Planning: Monica Townsend is aware of and agrees to continue to work on their treatment plan. They have identified and are working toward their discharge goals. yes    Visit start and stop times:    08/21/24  Start Time: 1600  Stop Time: 1647  Total Visit Time: 47 minutes  "

## 2024-11-27 ENCOUNTER — TELEMEDICINE (OUTPATIENT)
Dept: PSYCHIATRY | Facility: CLINIC | Age: 19
End: 2024-11-27
Payer: COMMERCIAL

## 2024-11-27 DIAGNOSIS — F41.1 GENERALIZED ANXIETY DISORDER: Primary | ICD-10-CM

## 2024-11-27 PROCEDURE — 90837 PSYTX W PT 60 MINUTES: CPT

## 2024-11-27 NOTE — BH TREATMENT PLAN
Outpatient Behavioral Health Psychotherapy Treatment Plan     Monica Townsend  2005      Date of Initial Psychotherapy Assessment: 5/11/2022   Date of Current Treatment Plan: 11/27/2024  Treatment Plan Target Date: 5/27/2025  Treatment Plan Expiration Date: 5/27/2025     Diagnosis:   1. Generalized anxiety disorder                Area(s) of Need: self-esteem, anxiety, living with a chronic illness  Strengths: intelligent, motivated, supportive family     Long Term Goal 1 (in the client's own words): maintain decreased level of anxiety     Stage of Change: Contemplation     Target Date for completion: 5/27/205             Anticipated therapeutic modalities: client centered, supportive and CBT             People identified to complete this goal: therapist and patient                    Objective 1: (identify the means of measuring success in meeting the objective): Remain engaged in a therapeutic relationship                    Objective 2: (identify the means of measuring success in meeting the objective): Continue to learn and utilize coping skills that can decrease her anxiety      Long Term Goal 2 (in the client's own words): value myself and establish healthy boundaries     Stage of Change: Contemplation     Target Date for completion: 5/27/2025             Anticipated therapeutic modalities: client centered, supportive and CBT             People identified to complete this goal: therapist and patient                    Objective 1: (identify the means of measuring success in meeting the objective): Remain engaged in therapeutic relationship                    Objective 2: (identify the means of measuring success in meeting the objective): Reframe negative thoughts to positive. Establish and maintain healthy boundaries with family and friends.      Long Term Goal 3 (in the client's own words): maintain physical health     Stage of Change: Contemplation     Target Date for completion: 5/27/2025              Anticipated therapeutic modalities: client centered, supportive and CBT             People identified to complete this goal: therapist and patient                    Objective 1: (identify the means of measuring success in meeting the objective): Remain engaged in therapeutic relationship                    Objective 2: (identify the means of measuring success in meeting the objective): Follow her physical medical protocol and treatment to live a healthy life post kidney transplant     I am currently under the care of a [facility] psychiatric provider: yes     My [facility] psychiatric provider is: Dr. Echevarria     I am currently taking psychiatric medications: Yes, as prescribed     I feel that I will be ready for discharge from mental health care when I reach the following (measurable goal/objective): Have better control over my anxiety; increased self-esteem and continued success in college     For children and adults who have a legal guardian:          Has there been any change to custody orders and/or guardianship status? No. If yes, attach updated documentation.     I have not yet created my Crisis Plan and will be offered a copy of this plan     Behavioral Health Treatment Plan [facility]: Diagnosis and Treatment Plan explained to Monica Townsend acknowledges an understanding of their diagnosis. Monica Townsend agrees to this treatment plan.     I have been offered a copy of this Treatment Plan. yes

## 2024-11-27 NOTE — PSYCH
Virtual Regular Visit    Verification of patient location:    Patient is located at Home in the following state in which I hold an active license [place]      Assessment/Plan:    Problem List Items Addressed This Visit       Generalized anxiety disorder - Primary       Goals addressed in session: Goal 1, Goal 2, and Goal 3           Reason for visit is No chief complaint on file.       Encounter provider Shannon Mancilla LCSW      Recent Visits  No visits were found meeting these conditions.  Showing recent visits within past 7 days and meeting all other requirements  Today's Visits  Date Type Provider Dept   11/27/24 Telemedicine Shannon Mancilla LCSW [facility]   Showing today's visits and meeting all other requirements  Future Appointments  No visits were found meeting these conditions.  Showing future appointments within next 150 days and meeting all other requirements       The patient was identified by name and date of birth. Monica Townsend was informed that this is a telemedicine visit and that the visit is being conducted throughthe Epic Embedded platform. She agrees to proceed..  My office door was closed. No one else was in the room.  She acknowledged consent and understanding of privacy and security of the video platform. The patient has agreed to participate and understands they can discontinue the visit at any time.    Patient is aware this is a billable service.     Subjective  Monica Townsend is a 19 y.o. female.  Behavioral Health Psychotherapy Progress Note    Psychotherapy Provided: Individual Psychotherapy     1. Generalized anxiety disorder            Goals addressed in session: Goal 1, Goal 2, and Goal 3      DATA: Monica attended her session. Therapist hasn't seen her for a few months. She got caught up in school and working at [employer]. Monica is doing well. She is on break for Thanksgiving and will go home tomorrow for dinner. She  doesn't want to stay at home because her bedroom has been made into a storage room. She would rather be at her apartment at school and get caught up on school work. Still getting caught up on school work from being out for her kidney transplant. Still having some time management issues when it comes to school work. Discussed the Pomodoro technique (work for 25 minutes, 5 minutes break and repeat until work is finished). Friends are good. Had a Friendsgiving this past Saturday. Monica reports that her father has been more emotionally available and calmer and they have had some really good conversations. Monica feels like she is setting some better boundaries with her parents. 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 Has both an [employer] concert and [employer] concert next weekend. Monica has gained some weight because of her medication (prednisone). She has gained 15 pounds and had to get some new clothes. Monica knows that the medication is necessary for her to live a healthy life with her new kidney. She is learning to accept her new body. Talked about focusing on health and what her body can do. Scheduled monthly appointments for the first half of 2025.  During this session, this clinician used the following therapeutic modalities: Client-centered Therapy and Supportive Psychotherapy    Substance Abuse was not addressed during this session. If the client is diagnosed with a co-occurring substance use disorder, please indicate any changes in the frequency or amount of use: N/A. Stage of change for addressing substance use diagnoses: No substance use/Not applicable    ASSESSMENT:  Monica Townsend presents with a Euthymic/ normal mood.     her affect is Normal range and intensity, which is congruent, with her mood and the content of the session. The client has made progress on their goals.     Monica Townsend presents with a none risk of suicide, none risk of self-harm, and none  "risk of harm to others.    For any risk assessment that surpasses a \"low\" rating, a safety plan must be developed.  A safety plan was indicated: no  If yes, describe in detail Monica banda current SI/HI/SIB    PLAN: Between sessions, Monica Townsend will continue to establish and maintain healthy boundaries with her family and friends. Get caught up on school work. At the next session, the therapist will use Client-centered Therapy and Supportive Psychotherapy to address anxiety, living post transplant and growing into a young adult.    Behavioral Health Treatment Plan and Discharge Planning: Monica Townsend is aware of and agrees to continue to work on their treatment plan. They have identified and are working toward their discharge goals. yes    Visit start and stop times:    11/27/24  Start Time: 1000  Stop Time: 1053  Total Visit Time: 53 minutes    HPI     Past Medical History:   Diagnosis Date    Alport syndrome     Asthma     Chronic kidney disease 01/2020    Encounter for routine child health examination without abnormal findings 2/20/2018    Hearing screen passed 2/20/2018    Lump of axilla, left 5/3/2018    Peritoneal dialysis catheter dysfunction, initial encounter ([facility])        Past Surgical History:   Procedure Laterality Date    IR NEPHROSTOMY TO NEPHROURETERAL STENT  12/29/2021    IR PERITONEAL DIALYSIS CATHETER CHECK OR REPOSITION  1/12/2022    IR PERITONEAL DIALYSIS CATHETER CHECK OR REPOSITION  3/16/2022    IR PERITONEAL DIALYSIS CATHETER CHECK OR REPOSITION  6/1/2022    IR PERITONEAL DIALYSIS CATHETER CHECK OR REPOSITION  6/22/2022    IR PERITONEAL DIALYSIS CATHETER CHECK OR REPOSITION  12/6/2022    IR PERITONEAL DIALYSIS CATHETER PLACEMENT  12/10/2021    AR LAPS W/REVISION INTRAPERITONEAL CATHETER N/A 1/19/2022    Procedure: INSERTION PERITONEAL CATHETER DIALYSIS LAPAROSCOPIC (manipulation of);  Surgeon: Josue Oliveira MD;  [facility];  Service: General    AR RENAL BIOPSY PRQ " TROCAR/NEEDLE Left 8/3/2020    Procedure: PERCUTANEOUS RENAL BIOPSY (US GUIDED);  Surgeon: Helder Osman MD;  [facility];  Service: Urology    KS RPR UMBILICAL HRNA 5 YRS/> REDUCIBLE N/A 1/19/2022    Procedure: REPAIR HERNIA UMBILICAL;  Surgeon: Josue Oliveira MD;  [facility];  Service: General    KS UNLISTED PROCEDURE ABDOMEN PERITONEUM & OMENTUM N/A 3/19/2022    Procedure: REVISION/ UNROOFING PERITONEAL CATHETER DIALYSIS  LAPAROSCOPIC;  Surgeon: Denia King MD;  [facility];  Service: General    US GUIDANCE  8/3/2020    WISDOM TOOTH EXTRACTION         Current Outpatient Medications   Medication Sig Dispense Refill    acetaminophen (TYLENOL) 325 mg tablet Take 650 mg by mouth every 6 (six) hours as needed for mild pain       albuterol (PROVENTIL HFA,VENTOLIN HFA) 90 mcg/act inhaler TAKE 2 PUFFS BY MOUTH EVERY 6 HOURS AS NEEDED FOR WHEEZE 18 Inhaler 0    calcitriol (ROCALTROL) 0.25 mcg capsule Take 1 capsule (0.25 mcg total) by mouth daily 90 capsule 3    calcium carbonate (TUMS) 500 mg chewable tablet Chew 1 tablet (500 mg total) 3 (three) times a day before meals 90 tablet 5    clotrimazole-betamethasone (LOTRISONE) 1-0.05 % cream APPLY TO AFFECTED AREA TWICE A DAY 30 g 0    ferrous sulfate 324 (65 Fe) mg Take 1 tablet (324 mg total) by mouth in the morning and 1 tablet (324 mg total) in the evening. Take before meals. 60 tablet 5    gentamicin (GARAMYCIN) 0.1 % cream Apply topically daily Use as directed to PD site 15 g 6    losartan (COZAAR) 50 mg tablet Take 1 tablet (50 mg total) by mouth daily 90 tablet 3    norelgestromin-ethinyl estradiol (Xulane) 150-35 MCG/24HR PLACE 1 PATCH ON SKIN. CHANGE WEEKLY FOR 3 WEEKS, THEN LEAVE OFF FOR 1 WEEK. 9 patch 0    ondansetron (ZOFRAN-ODT) 4 mg disintegrating tablet Take 1 tablet (4 mg total) by mouth every 6 (six) hours as needed for nausea or vomiting 30 tablet 1    polyethylene glycol (GLYCOLAX) 17 GM/SCOOP powder Take by mouth       sertraline (ZOLOFT) 100 mg tablet Take 1 tablet (100 mg total) by mouth daily 90 tablet 3    sorbitol 70 % solution Take 30 mL by mouth daily as needed (as needed for constipation and catheter dysfunction) for up to 10 days 150 mL 1     No current facility-administered medications for this visit.        Allergies   Allergen Reactions    Vancomycin Itching       Review of Systems    Video Exam    There were no vitals filed for this visit.    Physical Exam     Visit Time    Visit Start Time: 1000  Visit Stop Time: 1053  Total Visit Duration:  53 minutes

## 2025-01-22 ENCOUNTER — TELEPHONE (OUTPATIENT)
Dept: PSYCHIATRY | Facility: CLINIC | Age: 20
End: 2025-01-22

## 2025-01-23 ENCOUNTER — SOCIAL WORK (OUTPATIENT)
Dept: PSYCHIATRY | Facility: CLINIC | Age: 20
End: 2025-01-23
Payer: COMMERCIAL

## 2025-01-23 DIAGNOSIS — F43.21 GRIEF: Primary | ICD-10-CM

## 2025-01-23 PROCEDURE — 90834 PSYTX W PT 45 MINUTES: CPT

## 2025-01-23 NOTE — PSYCH
"Behavioral Health Psychotherapy Progress Note    Psychotherapy Provided: Individual Psychotherapy     1. Grief            Goals addressed in session: Goal 1, Goal 2, and Goal 3      DATA: Monica was a few minutes late for session. She looks fantastic. Monica stated that her dog of 11 years, Josiah,  on  [place]. He had congestive heart failure. He  at the vet's office before the whole family could get there. Monica really needed to talk about it and process the feelings she is having. The majority of the session was spent on her grief and loss. She returns to college on  to start the second semester of her senior year. Will see in one month.  During this session, this clinician used the following therapeutic modalities: Bereavement Therapy, Client-centered Therapy, and Supportive Psychotherapy    Substance Abuse was not addressed during this session. If the client is diagnosed with a co-occurring substance use disorder, please indicate any changes in the frequency or amount of use: N/A. Stage of change for addressing substance use diagnoses: No substance use/Not applicable    ASSESSMENT:  Monica Townsend presents with a  sad  mood.     her affect is Tearful, which is congruent, with her mood and the content of the session. The client has made progress on their goals.     Monica Townsend presents with a none risk of suicide, none risk of self-harm, and none risk of harm to others.    For any risk assessment that surpasses a \"low\" rating, a safety plan must be developed.    A safety plan was indicated: no  If yes, describe in detail Monica denies current SI/HI/SIB    PLAN: Between sessions, Monica Townsend will go through the stages of grief over the loss of her dog, Josiah. Return to college for the second semester of her cary year and do her best. Continue following post transplant medical protocol. At the next session, the therapist will use Client-centered Therapy and Supportive " Psychotherapy to address anxiety, post transplant life and college.    Behavioral Health Treatment Plan and Discharge Planning: Monica Townsend is aware of and agrees to continue to work on their treatment plan. They have identified and are working toward their discharge goals. yes    Depression Follow-up Plan Completed: Not applicable    Visit start and stop times:    01/23/25  Start Time: 1010  Stop Time: 1100  Total Visit Time: 50 minutes

## 2025-02-24 ENCOUNTER — TELEMEDICINE (OUTPATIENT)
Dept: PSYCHIATRY | Facility: CLINIC | Age: 20
End: 2025-02-24
Payer: COMMERCIAL

## 2025-02-24 DIAGNOSIS — F41.9 ANXIETY: Primary | ICD-10-CM

## 2025-02-24 PROCEDURE — 90834 PSYTX W PT 45 MINUTES: CPT

## 2025-02-24 NOTE — PSYCH
Virtual Regular VisitName: Monica Townsend      : 2005      MRN: [id number]  Encounter Provider: Shannon Mancilla LCSW  Encounter Date: 2025   Encounter department: [facility] [place]  :  Assessment & Plan  Anxiety             Goals addressed in session: Goal 1, Goal 2, and Goal 3      Depression Follow-up Plan Completed: Not applicable    Reason for visit is No chief complaint on file.     Recent Visits  No visits were found meeting these conditions.  Showing recent visits within past 7 days and meeting all other requirements  Today's Visits  Date Type Provider Dept   25 Telemedicine Shannon Mancilla LCSW [facility] [place]   Showing today's visits and meeting all other requirements  Future Appointments  No visits were found meeting these conditions.  Showing future appointments within next 150 days and meeting all other requirements     History of Present Illness     Monica Townsend is a 19 y.o. female.  HPI    Past Medical History:   Diagnosis Date    Alport syndrome     Asthma     Chronic kidney disease 2020    Encounter for routine child health examination without abnormal findings 2018    Hearing screen passed 2018    Lump of axilla, left 5/3/2018    Peritoneal dialysis catheter dysfunction, initial encounter ([facility])      Past Surgical History:   Procedure Laterality Date    IR NEPHROSTOMY TO NEPHROURETERAL STENT  2021    IR PERITONEAL DIALYSIS CATHETER CHECK OR REPOSITION  2022    IR PERITONEAL DIALYSIS CATHETER CHECK OR REPOSITION  3/16/2022    IR PERITONEAL DIALYSIS CATHETER CHECK OR REPOSITION  2022    IR PERITONEAL DIALYSIS CATHETER CHECK OR REPOSITION  2022    IR PERITONEAL DIALYSIS CATHETER CHECK OR REPOSITION  2022    IR PERITONEAL DIALYSIS CATHETER PLACEMENT  12/10/2021    MO LAPS W/REVISION INTRAPERITONEAL CATHETER N/A 2022    Procedure:  INSERTION PERITONEAL CATHETER DIALYSIS LAPAROSCOPIC (manipulation of);  Surgeon: Josue Oliveira MD;  [facility];  Service: General    MD RENAL BIOPSY PRQ TROCAR/NEEDLE Left 8/3/2020    Procedure: PERCUTANEOUS RENAL BIOPSY (US GUIDED);  Surgeon: Helder Osman MD;  [facility];  Service: Urology    MD RPR UMBILICAL HRNA 5 YRS/> REDUCIBLE N/A 1/19/2022    Procedure: REPAIR HERNIA UMBILICAL;  Surgeon: Josue Oliveira MD;  [facility];  Service: General    MD UNLISTED PROCEDURE ABDOMEN PERITONEUM & OMENTUM N/A 3/19/2022    Procedure: REVISION/ UNROOFING PERITONEAL CATHETER DIALYSIS  LAPAROSCOPIC;  Surgeon: Denia King MD;  [facility];  Service: General    US GUIDANCE  8/3/2020    WISDOM TOOTH EXTRACTION       Current Outpatient Medications   Medication Instructions    acetaminophen (TYLENOL) 650 mg, Oral, Every 6 hours PRN    albuterol (PROVENTIL HFA,VENTOLIN HFA) 90 mcg/act inhaler TAKE 2 PUFFS BY MOUTH EVERY 6 HOURS AS NEEDED FOR WHEEZE    calcitriol (ROCALTROL) 0.25 mcg, Oral, Daily    calcium carbonate (TUMS) 500 mg, Oral, 3 times daily before meals    clotrimazole-betamethasone (LOTRISONE) 1-0.05 % cream 1 Application, Topical, 2 times daily, To affected area    ferrous sulfate 324 mg, Oral, 2 times daily before meals    gentamicin (GARAMYCIN) 0.1 % cream Topical, Daily, Use as directed to PD site    losartan (COZAAR) 50 mg, Oral, Daily    norelgestromin-ethinyl estradiol (Xulane) 150-35 MCG/24HR PLACE 1 PATCH ON SKIN. CHANGE WEEKLY FOR 3 WEEKS, THEN LEAVE OFF FOR 1 WEEK.    ondansetron (ZOFRAN-ODT) 4 mg, Oral, Every 6 hours PRN    polyethylene glycol (GLYCOLAX) 17 GM/SCOOP powder Oral    sertraline (ZOLOFT) 100 mg, Oral, Daily    sorbitol 70 % solution 30 mL, Oral, Daily PRN     Allergies   Allergen Reactions    Vancomycin Itching       Review of Systems    Objective   There were no vitals taken for this visit.    Video Exam  Physical Exam   Behavioral Health  "Psychotherapy Progress Note    Psychotherapy Provided: Individual Psychotherapy     1. Anxiety            Goals addressed in session: Goal 1, Goal 2, and Goal 3      DATA: Monica looks physically well. Says that she is tired but just from normal college student stuff. She is so excited to just be a normal college student and to no longer have to worry about being on dialysis. Her transplant has been a life changer for her. School is going well. Has 18 credits this semester. Working at [employer] on M and Fr from 2-6PM. Roommates are getting along well. Anxiety has been manageable.Recovering from the loss of her dog, Snoopy. She had no current concerns. Will see in one month.  During this session, this clinician used the following therapeutic modalities: Client-centered Therapy and Supportive Psychotherapy    Substance Abuse was not addressed during this session. If the client is diagnosed with a co-occurring substance use disorder, please indicate any changes in the frequency or amount of use: N/A. Stage of change for addressing substance use diagnoses: No substance use/Not applicable    ASSESSMENT:  Monica Townsend presents with a Euthymic/ normal mood.     her affect is Normal range and intensity, which is congruent, with her mood and the content of the session. The client has made progress on their goals.     Monica Townsend presents with a none risk of suicide, none risk of self-harm, and none risk of harm to others.    For any risk assessment that surpasses a \"low\" rating, a safety plan must be developed.    A safety plan was indicated: no  If yes, describe in detail Monica denies current SI/HI/SIB    PLAN: Between sessions, Monica Townsend will continue to use her coping skills to manage her way through a heavy class load and college life. At the next session, the therapist will use Client-centered Therapy and Supportive Psychotherapy to address anxiety and succeeding at college.    Behavioral Health " Treatment Plan and Discharge Planning: Monica Townsend is aware of and agrees to continue to work on their treatment plan. They have identified and are working toward their discharge goals. yes    Depression Follow-up Plan Completed: Not applicable    Visit start and stop times:    02/24/25  Start Time: 1200  Stop Time: 1246  Total Visit Time: 46 minutes    Administrative Statements   Encounter provider Shannon Mancilla, [facility]    The Patient is located at Home and in the following state in which I hold an active license [place].    The patient was identified by name and date of birth. Monica Townsend was informed that this is a telemedicine visit and that the visit is being conducted through the Epic Embedded platform. She agrees to proceed..  My office door was closed. No one else was in the room.  She acknowledged consent and understanding of privacy and security of the video platform. The patient has agreed to participate and understands they can discontinue the visit at any time.    Visit Time    Visit Start Time: 1200  Visit Stop Time: 1246  Total Visit Duration:  46 minutes

## 2025-03-26 ENCOUNTER — TELEPHONE (OUTPATIENT)
Dept: PSYCHIATRY | Facility: CLINIC | Age: 20
End: 2025-03-26

## 2025-03-26 DIAGNOSIS — F32.0 MDD (MAJOR DEPRESSIVE DISORDER), SINGLE EPISODE, MILD (HCC): ICD-10-CM

## 2025-03-26 DIAGNOSIS — F41.1 GENERALIZED ANXIETY DISORDER: ICD-10-CM

## 2025-03-26 RX ORDER — SERTRALINE HYDROCHLORIDE 100 MG/1
100 TABLET, FILM COATED ORAL DAILY
Qty: 90 TABLET | Refills: 3 | OUTPATIENT
Start: 2025-03-26

## 2025-03-26 NOTE — TELEPHONE ENCOUNTER
Writer called and left a message requesting a call back to schedule follow appt and check if she is still taking zolof.  
No...

## 2025-04-08 DIAGNOSIS — F41.1 GENERALIZED ANXIETY DISORDER: ICD-10-CM

## 2025-04-08 DIAGNOSIS — F32.0 MDD (MAJOR DEPRESSIVE DISORDER), SINGLE EPISODE, MILD (HCC): ICD-10-CM

## 2025-04-08 RX ORDER — SERTRALINE HYDROCHLORIDE 100 MG/1
100 TABLET, FILM COATED ORAL DAILY
Qty: 90 TABLET | Refills: 0 | OUTPATIENT
Start: 2025-04-08

## 2025-04-08 NOTE — TELEPHONE ENCOUNTER
Reason for call:   [x] Refill   [] Prior Auth  [] Other:     Office:   [] PCP/Provider -   [x] Specialty/Provider -     Medication: Sertraline 100 mg, take 1 tablet by mouth daily       Pharmacy: [facility] [place]     Local Pharmacy   Does the patient have enough for 3 days?   [] Yes   [x] No - Send as HP to POD    Mail Away Pharmacy   Does the patient have enough for 10 days?   [] Yes   [] No - Send as HP to POD

## 2025-04-09 ENCOUNTER — TELEPHONE (OUTPATIENT)
Dept: PSYCHIATRY | Facility: CLINIC | Age: 20
End: 2025-04-09

## 2025-04-09 DIAGNOSIS — F41.1 GENERALIZED ANXIETY DISORDER: ICD-10-CM

## 2025-04-09 DIAGNOSIS — F32.0 MDD (MAJOR DEPRESSIVE DISORDER), SINGLE EPISODE, MILD (HCC): ICD-10-CM

## 2025-04-09 RX ORDER — SERTRALINE HYDROCHLORIDE 100 MG/1
100 TABLET, FILM COATED ORAL DAILY
Qty: 15 TABLET | Refills: 0 | Status: SHIPPED | OUTPATIENT
Start: 2025-04-09

## 2025-04-09 NOTE — TELEPHONE ENCOUNTER
Patient was scheduled for tomorrow 4/9 at 8:30am virtually with . Patient is aware if this appt is missed, there is no guarantee that her medication may be refilled again.

## 2025-04-09 NOTE — TELEPHONE ENCOUNTER
Hi: Thank you for scheduling this patient with me for tomorrow morning. I see its a virtual visit. She had mentioned in previous message that she is travelling. I hope she is within [place] tomorrow as virtual visits are not allowed if patient is outside the state. Can you please confirm with her? Please also let her know I have called in her zoloft for 15 days but can refill for longer period after visit. Thank you     Per dr Echevarria, patient was contacted to make sure that patient will be in the state of [place] at the time of her appt. Upon c/b just confirm where she will e at time of her appt.

## 2025-04-10 ENCOUNTER — TELEPHONE (OUTPATIENT)
Dept: PSYCHIATRY | Facility: CLINIC | Age: 20
End: 2025-04-10

## 2025-04-10 NOTE — TELEPHONE ENCOUNTER
The patient no show to today's appt. The writer called and left her a message requesting a call back to reschedule.     A no show letter was sent via [employer].

## 2025-04-21 ENCOUNTER — TELEMEDICINE (OUTPATIENT)
Dept: PSYCHIATRY | Facility: CLINIC | Age: 20
End: 2025-04-21
Payer: COMMERCIAL

## 2025-04-21 DIAGNOSIS — F41.1 GENERALIZED ANXIETY DISORDER: Primary | ICD-10-CM

## 2025-04-21 PROCEDURE — 90834 PSYTX W PT 45 MINUTES: CPT

## 2025-04-21 NOTE — BH TREATMENT PLAN
Outpatient Behavioral Health Psychotherapy Treatment Plan     Monica Townsend  2005      Date of Initial Psychotherapy Assessment: 5/11/2022   Date of Current Treatment Plan: 4/21/2025  Treatment Plan Target Date: TBD  Treatment Plan Expiration Date: 10/21/2025     Diagnosis:   1. Generalized anxiety disorder                Area(s) of Need: self-esteem, anxiety, living with a chronic illness  Strengths: intelligent, motivated, supportive family, aware of her emotional needs     Long Term Goal 1 (in the client's own words): maintain decreased level of anxiety     Stage of Change: Contemplation     Target Date for completion: TBD             Anticipated therapeutic modalities: client centered, supportive and CBT             People identified to complete this goal: therapist and patient                    Objective 1: (identify the means of measuring success in meeting the objective): Remain engaged in a therapeutic relationship                    Objective 2: (identify the means of measuring success in meeting the objective): Continue to learn and utilize coping skills that can decrease her anxiety      Long Term Goal 2 (in the client's own words): value myself and establish healthy boundaries     Stage of Change: Contemplation     Target Date for completion: TBD             Anticipated therapeutic modalities: client centered, supportive and CBT             People identified to complete this goal: therapist and patient                    Objective 1: (identify the means of measuring success in meeting the objective): Remain engaged in therapeutic relationship                    Objective 2: (identify the means of measuring success in meeting the objective): Reframe negative thoughts to positive. Establish and maintain healthy boundaries with family and friends.      Long Term Goal 3 (in the client's own words): maintain physical health     Stage of Change: Contemplation     Target Date for completion: TBD              Anticipated therapeutic modalities: client centered, supportive and CBT             People identified to complete this goal: therapist and patient                    Objective 1: (identify the means of measuring success in meeting the objective): Remain engaged in therapeutic relationship                    Objective 2: (identify the means of measuring success in meeting the objective): Follow her physical medical protocol and treatment to live a healthy life post kidney transplant     I am currently under the care of a [facility] psychiatric provider: yes     My [facility] psychiatric provider is: Dr. Echevarria     I am currently taking psychiatric medications: Yes, as prescribed     I feel that I will be ready for discharge from mental health care when I reach the following (measurable goal/objective): Have better control over my anxiety; increased self-esteem and continued success in college     For children and adults who have a legal guardian:          Has there been any change to custody orders and/or guardianship status? No. If yes, attach updated documentation.     I have created my Crisis Plan and was offered a copy of this plan     Behavioral Health Treatment Plan  Luke: Diagnosis and Treatment Plan explained to Monica Townsend acknowledges an understanding of their diagnosis. Monica Townsned agrees to this treatment plan.     I have been offered a copy of this Treatment Plan. yes

## 2025-04-21 NOTE — BH CRISIS PLAN
Client Name: Monica Townsend       Client YOB: 2005    CurtisNicolas Safety Plan      Creation Date: 4/21/25 Update Date: 4/21/26   Created By: Shannon Mancilla LCSW Last Updated By: Shannon Mancilla LCSW      Step 1: Warning Signs:   Warning Signs   become emotional   irritable   school stress            Step 2: Internal Coping Strategies:   Internal Coping Strategies   journaling   video rants that she saves for herself   music            Step 3: People and social settings that provide distraction:   Name Contact Information   Rose Mary (friend) in her phone   Parents in her phone    Places   Her room   Sit in her car           Step 4: People whom I can ask for help during a crisis:      Name Contact Information    Roommates in her phone    Parents in her phone      Step 5: Professionals or agencies I can contact during a crisis:      Clinican/Agency Name Phone Emergency Contact    Dr. Wale Mancilla Email     [facility] on campus         Crisis Phone Numbers:   Suicide Prevention Lifeline: Call or Text  248 Crisis Text Line: Text HOME to 818-324   Please note: Some [facility] do not have a separate number for Child/Adolescent specific crisis. If your county is not listed under Child/Adolescent, please call the adult number for your county      Adult Crisis Numbers: Child/Adolescent Crisis Numbers   [facility]: [phone] [facility]: [phone]   [facility]: [phone] [facility]: [phone]   [facility]: [phone] [place], [place]: [phone]   [facility]: [phone] [place]: [phone]   [place]: [phone]   [facility]: [phone]   [facility]: [phone]   [facility]: [phone] (daytime) [phone] (after hours, weekends, holidays)      Step 6: Making the environment safer (plan for lethal means safety):    Patient did not identify any lethal methods: Yes     Optional: What is most important to me and worth living for?   Herself and her family     [place] Safety Plan. Katherine Acevedo and Ferddy Valenzuela. Used with permission of the authors.

## 2025-05-07 ENCOUNTER — TELEPHONE (OUTPATIENT)
Dept: PSYCHIATRY | Facility: CLINIC | Age: 20
End: 2025-05-07

## 2025-05-07 ENCOUNTER — TELEPHONE (OUTPATIENT)
Age: 20
End: 2025-05-07

## 2025-05-08 ENCOUNTER — TELEMEDICINE (OUTPATIENT)
Dept: PSYCHIATRY | Facility: CLINIC | Age: 20
End: 2025-05-08
Payer: COMMERCIAL

## 2025-05-08 ENCOUNTER — TELEPHONE (OUTPATIENT)
Dept: PSYCHIATRY | Facility: CLINIC | Age: 20
End: 2025-05-08

## 2025-05-08 DIAGNOSIS — F41.1 GENERALIZED ANXIETY DISORDER: ICD-10-CM

## 2025-05-08 DIAGNOSIS — F32.0 MDD (MAJOR DEPRESSIVE DISORDER), SINGLE EPISODE, MILD (HCC): ICD-10-CM

## 2025-05-08 PROCEDURE — 99214 OFFICE O/P EST MOD 30 MIN: CPT | Performed by: PSYCHIATRY & NEUROLOGY

## 2025-05-08 RX ORDER — SERTRALINE HYDROCHLORIDE 100 MG/1
100 TABLET, FILM COATED ORAL DAILY
Qty: 90 TABLET | Refills: 1 | Status: SHIPPED | OUTPATIENT
Start: 2025-05-08 | End: 2025-11-04

## 2025-05-08 NOTE — PSYCH
MEDICATION MANAGEMENT NOTE    Name: Monica Townsend      : 2005      MRN: [id number]  Encounter Provider: Gerson Echevarria MD  Encounter Date: 2025   Encounter department: [facility]    Insurance: Payor: AETNA / Plan: AET[employer] PPO / Product Type: PPO /      Reason for Visit:   Chief Complaint   Patient presents with    Virtual Regular Visit     :Administrative Statements   Encounter provider Gerson Echevarria MD    The Patient is located at Other and in the following state in which I hold an active license [place].    The patient was identified by name and date of birth. Monica Townsend was informed that this is a telemedicine visit and that the visit is being conducted through the Epic Embedded platform. She agrees to proceed..  My office door was closed. No one else was in the room.  She acknowledged consent and understanding of privacy and security of the video platform. The patient has agreed to participate and understands they can discontinue the visit at any time.    I have spent a total time of 25 minutes in caring for this patient on the day of the visit/encounter including Risks and benefits of tx options, Instructions for management, Patient and family education, Importance of tx compliance, Risk factor reductions, Impressions, Counseling / Coordination of care, Reviewing/placing orders in the medical record (including tests, medications, and/or procedures), and Obtaining or reviewing history  , not including the time spent for establishing the audio/video connection.   Assessment & Plan  Generalized anxiety disorder    Orders:    sertraline (ZOLOFT) 100 mg tablet; Take 1 tablet (100 mg total) by mouth daily    MDD (major depressive disorder), single episode, mild ([facility])    Orders:    sertraline (ZOLOFT) 100 mg tablet; Take 1 tablet (100 mg total) by mouth daily    Generalized anxiety disorder         MDD (major depressive disorder), single episode, mild  (H[facility])         The patient has been having mild increase in depression and anxiety since taking Zoloft every other day.  No SI or HI.  Plan is to continue with Zoloft 100 mg 1 tablet every day for depression and anxiety.  She was encouraged to be compliant with her appointment and seeing at least once every 6 months.  The patient reports being busy with her schoolwork and can only see during vacation and was advised to review her availability with my support staff to have an appointment around the holidays.  She was educated about her psychiatric medication Zoloft in detail and advised to call me if there is any concern and to call [facility], 911 or visit nearby ER in case of any emergency having SI.  She also was advised to call me for depression anxiety still does not get better over next 3 to 4 weeks and I can go up on the dose of Zoloft.  The patient verbalized understanding agrees with the plan.  She will follow with me in 6 months or sooner if needed as per the patient wishes.      Treatment Recommendations:    Educated about diagnosis and treatment modalities. Verbalizes understanding and agreement with the treatment plan.  Discussed self monitoring of symptoms, and symptom monitoring tools.  Discussed medications and if treatment adjustment was needed or desired.  Aware of 24 hour and weekend coverage for urgent situations accessed by calling [facility] main practice number  I am scheduling this patient out for greater than 3 months: Yes - Patient's stability of symptoms warrant this length of time or no significant changes to treatment plan    Medications Risks/Benefits:      Risks, Benefits And Possible Side Effects Of Medications:    Risks, benefits, and possible side effects of medications explained to Monica and she (or legal representative) verbalizes understanding and agreement for treatment.  Risks of medications in pregnancy or becoming pregnant explained to Monica. She  verbalizes understanding and agrees to discuss treatment if planning to become pregnant, or if she become pregnant.    Controlled Medication Discussion:     Not applicable      History of Present Illness     The patient was last seen by me more than a year back.  She did reported she overall was doing well.  She reports she got a kidney transplant done in March of last year and no longer has to use peritoneal dialysis every night.  She reported she felt very happy and relieved.  Denied any complication from the procedure.  She did reported she last 2 months had been having increase in anxiety and depression in the setting of not being able to take Zoloft consistently.  The patient was called by my office few times to schedule appointment given it was more than 6 months since she last saw me.  She was still given refill on Zoloft till 2 months back when the patient was advised she needs to follow-up with me before she can have her medication refill given no follow-up in a long time.  The patient refills were still sent but it was sent only for 15 tablets last time as patient had no-show to the appointment.  She reported since last 1 to 2 months she has been taking Zoloft every other day given she would have run out.  The patient reports once she goes off the Zoloft she does feel anxious and mood being depressed.  She reports anxiety though has been mild and on the days when she takes Zoloft she feels much better.  She reports she also felt very low more than a month back specially feeling lonely  and not having good support from her friends but she reports it has resolved too.  She also feels it started after she was taking Zoloft every other day.  The patient denies any SI or HI.  She reports her sleep has been good.  Appetite and energy level has been fine concentration has been good.  Has 1 year left for completing her bachelor's.  She is future oriented and hopeful.  The patient feels all of all this has been  helpful and wants to continue with taking 100 mg daily.  She was advised once she is  consistently taking Zoloft every day for another few weeks to call me if she still feels depressed and anxious and I can go up on the dose.  The patient denies any other mental health concerns.  Denies any other medical issues lately.    Review Of Systems: A review of systems is obtained and is negative except for the pertinent positives listed in HPI/Subjective above.      Current Rating Scores:     Current PHQ-9   PHQ-2/9 Depression Screening           Current QUANG-7     Areas of Improvement: reviewed in HPI/Subjective Section and reviewed in Assessment and Plan Section      Past Medical History:   Diagnosis Date    Alport syndrome     Asthma     Chronic kidney disease 01/2020    Encounter for routine child health examination without abnormal findings 2/20/2018    Hearing screen passed 2/20/2018    Lump of axilla, left 5/3/2018    Peritoneal dialysis catheter dysfunction, initial encounter ([facility])      Past Surgical History:   Procedure Laterality Date    IR NEPHROSTOMY TO NEPHROURETERAL STENT  12/29/2021    IR PERITONEAL DIALYSIS CATHETER CHECK OR REPOSITION  1/12/2022    IR PERITONEAL DIALYSIS CATHETER CHECK OR REPOSITION  3/16/2022    IR PERITONEAL DIALYSIS CATHETER CHECK OR REPOSITION  6/1/2022    IR PERITONEAL DIALYSIS CATHETER CHECK OR REPOSITION  6/22/2022    IR PERITONEAL DIALYSIS CATHETER CHECK OR REPOSITION  12/6/2022    IR PERITONEAL DIALYSIS CATHETER PLACEMENT  12/10/2021    [place] LAPS W/REVISION INTRAPERITONEAL CATHETER N/A 1/19/2022    Procedure: INSERTION PERITONEAL CATHETER DIALYSIS LAPAROSCOPIC (manipulation of);  Surgeon: Josue Oliveira MD;  Location: [facility];  Service: General    WV RENAL BIOPSY PRQ TROCAR/NEEDLE Left 8/3/2020    Procedure: PERCUTANEOUS RENAL BIOPSY (US GUIDED);  Surgeon: Helder Osman MD;  Location: [facility];  Service: Urology    WV RPR UMBILICAL HRNA 5 YRS/> REDUCIBLE N/A 1/19/2022     Procedure: REPAIR HERNIA UMBILICAL;  Surgeon: Josue Oliveira MD;  Location: [facility];  Service: General    CO UNLISTED PROCEDURE ABDOMEN PERITONEUM & OMENTUM N/A 3/19/2022    Procedure: REVISION/ UNROOFING PERITONEAL CATHETER DIALYSIS  LAPAROSCOPIC;  Surgeon: Denia King MD;  Location: [facility];  Service: General    US GUIDANCE  8/3/2020    WISDOM TOOTH EXTRACTION       Allergies:   Allergies   Allergen Reactions    Vancomycin Itching       Current Outpatient Medications   Medication Instructions    acetaminophen (TYLENOL) 650 mg, Oral, Every 6 hours PRN    albuterol (PROVENTIL HFA,VENTOLIN HFA) 90 mcg/act inhaler TAKE 2 PUFFS BY MOUTH EVERY 6 HOURS AS NEEDED FOR WHEEZE    calcitriol (ROCALTROL) 0.25 mcg, Oral, Daily    calcium carbonate (TUMS) 500 mg, Oral, 3 times daily before meals    clotrimazole-betamethasone (LOTRISONE) 1-0.05 % cream 1 Application, Topical, 2 times daily, To affected area    ferrous sulfate 324 mg, Oral, 2 times daily before meals    gentamicin (GARAMYCIN) 0.1 % cream Topical, Daily, Use as directed to PD site    losartan (COZAAR) 50 mg, Oral, Daily    norelgestromin-ethinyl estradiol (Xulane) 150-35 MCG/24HR PLACE 1 PATCH ON SKIN. CHANGE WEEKLY FOR 3 WEEKS, THEN LEAVE OFF FOR 1 WEEK.    ondansetron (ZOFRAN-ODT) 4 mg, Oral, Every 6 hours PRN    polyethylene glycol (GLYCOLAX) 17 GM/SCOOP powder Oral    sertraline (ZOLOFT) 100 mg, Oral, Daily    sorbitol 70 % solution 30 mL, Oral, Daily PRN        Substance Abuse History:    Tobacco, Alcohol and Drug Use History     Tobacco Use    Smoking status: Never     Passive exposure: Never    Smokeless tobacco: Never    Tobacco comments:     No passive smoke exposure   Vaping Use    Vaping status: Never Used   Substance Use Topics    Alcohol use: Not Currently    Drug use: No          Social History:    Social History     Socioeconomic History    Marital status: Single     Spouse name: Not on file    Number of children: Not on file    Years  of education: Not on file    Highest education level: Not on file   Occupational History    Not on file   Other Topics Concern    Not on file   Social History Narrative    Lives with parents(living together,never )        Family Psychiatric History:     Family History   Problem Relation Age of Onset    No Known Problems Mother     No Known Problems Father     No Known Problems Brother     Hypothyroidism Maternal Grandmother         Acquired    Diabetes Maternal Grandmother     Diabetes Maternal Grandfather     No Known Problems Paternal Grandmother     No Known Problems Paternal Grandfather        Medical History Reviewed by provider this encounter:  Tobacco  Allergies  Meds  Problems  Med Hx  Surg Hx  Fam Hx          Objective   There were no vitals taken for this visit.     Mental Status Evaluation:    Appearance age appropriate, casually dressed   Behavior cooperative, calm   Speech normal rate, normal volume, normal pitch, spontaneous   Mood mildly anxious, mildly depressed   Affect brighter   Thought Processes organized, goal directed   Thought Content no overt delusions   Perceptual Disturbances: no auditory hallucinations, no visual hallucinations   Abnormal Thoughts  Risk Potential Suicidal ideation - None  Homicidal ideation - None  Potential for aggression - No   Orientation oriented to person, place, time/date, and situation   Memory recent and remote memory grossly intact   Consciousness alert and awake   Attention Span Concentration Span attention span and concentration are age appropriate   Intellect appears to be of average intelligence   Insight intact   Judgement intact   Muscle Strength and  Gait unable to assess today due to virtual visit   Motor activity unable to assess today due to virtual visit   Language no difficulty naming common objects, no difficulty repeating a phrase   Fund of Knowledge adequate knowledge of current events  adequate fund of knowledge regarding past  history  adequate fund of knowledge regarding vocabulary        Laboratory Results: I have personally reviewed all pertinent laboratory/tests results    Most Recent Labs:   Lab Results   Component Value Date    WBC 5.50 07/07/2023    RBC 3.65 (L) 07/07/2023    HGB 11.0 (L) 07/07/2023    HCT 34.4 (L) 07/07/2023     07/07/2023    RDW 12.4 07/07/2023    NEUTROABS 2.81 07/07/2023    SODIUM 136 07/11/2024    K 4.0 07/11/2024     07/11/2024    CO2 22 07/11/2024    BUN 19 07/11/2024    CREATININE 0.74 07/11/2024    GLUCOSE 86 01/19/2022    CALCIUM 9.8 07/11/2024    AST 5 06/25/2023    ALT 11 (L) 06/25/2023    ALKPHOS 108 06/25/2023    TP 6.8 06/25/2023    TBILI 0.37 06/25/2023    HFK2RMONXOEF 1.030 05/20/2021    FREET4 0.82 01/23/2018    PREGSERUM Negative 12/26/2022    HCGQUANT <2 06/19/2020       Suicide/Homicide Risk Assessment:    Risk of Harm to Self:  The following ratings are based on assessment at the time of the interview  Demographic Risk Factors include: never , age: young adult (15-24)  Historical Risk Factors include: history of depression, history of anxiety  Recent Specific Risk Factors include: diagnosis of depression, current anxiety symptoms  Protective Factors: no current suicidal ideation, access to mental health treatment, compliant with medications, stable living environment, supportive family, supportive friends  Weapons/Firearms: no firearms. The following steps have been taken to ensure weapons are properly secured: not applicable  Based on today's assessment, Monica presents the following risk of harm to self: minimal    Risk of Harm to Others:  The following ratings are based on assessment at the time of the interview  Based on today's assessment, Monica presents the following risk of harm to others: none    The following interventions are recommended: Continue medication management. No other intervention changes indicated at this time.    Psychotherapy Provided:  "    Individual psychotherapy provided: Yes Counseling was provided during the session today for  12  minutes.  Medications, treatment progress and treatment plan reviewed with Monica.  Medication education provided to Monica.  Reassurance and supportive therapy provided.   Crisis/safety plan discussed with Monica.    Treatment Plan:    Completed and signed during the session: Not applicable - Treatment Plan to be completed by [facility] therapist.    Goals: Progress towards Treatment Plan goals - Yes, progressing, as evidenced by subjective findings in HPI/Subjective Section and in Assessment and Plan Section    Depression Follow-up Plan Completed: No    Note Share:    This note was shared with patient.        Visit Time  Visit Start Time: 11:30 AM  Visit Stop Time: 12 PM  Total Visit Duration:  30 minutes    Portions of the record may have been created with voice recognition software. Occasional wrong word or \"sound a like\" substitutions may have occurred due to the inherent limitations of voice recognition software. Read the chart carefully and recognize, using context, where substitutions have occurred.    Gerson Echevarria MD 05/08/25  "

## 2025-06-16 ENCOUNTER — SOCIAL WORK (OUTPATIENT)
Dept: PSYCHIATRY | Facility: CLINIC | Age: 20
End: 2025-06-16
Payer: COMMERCIAL

## 2025-06-16 DIAGNOSIS — F33.0 MILD EPISODE OF RECURRENT MAJOR DEPRESSIVE DISORDER (HCC): Primary | ICD-10-CM

## 2025-06-16 DIAGNOSIS — F41.9 ANXIETY: ICD-10-CM

## 2025-06-16 PROCEDURE — 90832 PSYTX W PT 30 MINUTES: CPT

## 2025-06-16 NOTE — PSYCH
Behavioral Health Psychotherapy Progress Note    Psychotherapy Provided: Individual Psychotherapy     1. Mild episode of recurrent major depressive disorder (HCC)        2. Anxiety            Goals addressed in session: Goal 1, Goal 2, and Goal 3      DATA: Monica arrived super late and no showed her session in May. Says that she has been feeling depressed and stopped trying. Has some survivor's guilt from her transplant. Got a new kidney and now she still can't keep up with things. Discussed that her new kidney isn't magical. It gave her improved physical health but she still has to go through all of the normal life challenges like everyone else. She failed two classes this last semester which was a big hit to her pride and identity. She is going to change her major. She has no motivation to practice her instrument or perform. Professors aren't supportive and have no empathy. She also says that they are racist and ableist. Monica shared that she hasn't been happy. There are several other students also leaving the program. Plans to get a BA in Music and still graduate on time. 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 Talked to her mother about it. She has guilt about letting her parents down. Monica said that she wasn't negotiating with her mother but was telling her what was going to happen. She isn't going to let people tell her how she feels anymore. She is mourning the dream and plan she had but she believes that she will be happier moving forward. She hasn't told her father yet. Discussed needing to work through her feelings so she doesn't have to [place] back and repeat it again. Scheduled some more frequent appointments. Will see Monica through senior year and then transfer to  and adult therapist. Will see in one week.  During this session, this clinician used the following therapeutic modalities: Client-centered Therapy and Supportive Psychotherapy    Substance Abuse was not addressed during this session.  "If the client is diagnosed with a co-occurring substance use disorder, please indicate any changes in the frequency or amount of use: N/A. Stage of change for addressing substance use diagnoses: No substance use/Not applicable    ASSESSMENT:  Monica Townsend presents with a Depressed mood.     her affect is Normal range and intensity, which is congruent, with her mood and the content of the session. The client has made progress on their goals.     Monica Townsend presents with a none risk of suicide, none risk of self-harm, and none risk of harm to others.    For any risk assessment that surpasses a \"low\" rating, a safety plan must be developed.    A safety plan was indicated: no  If yes, describe in detail Monica denies current SI/HI/SIB. Can talk to parents, brother, friends if thoughts arise. Aware of [facility] hotline.    PLAN: Between sessions, Monica Townsend will talk to her  to switch her major and select her classes for senior year. Realize that her transplanted kidney is not magical. Still need to go through the feelings she experiences. At the next session, the therapist will use Client-centered Therapy and Supportive Psychotherapy to address depression over changing majors in college, feeling like she has let others down and pursuing her new goals.    Behavioral Health Treatment Plan and Discharge Planning: Monica Townsend is aware of and agrees to continue to work on their treatment plan. They have identified and are working toward their discharge goals. yes    Depression Follow-up Plan Completed: Yes    Visit start and stop times:    06/16/25  Start Time: 1224  Stop Time: 1255  Total Visit Time: 31 minutes  "

## 2025-07-02 ENCOUNTER — SOCIAL WORK (OUTPATIENT)
Dept: PSYCHIATRY | Facility: CLINIC | Age: 20
End: 2025-07-02
Payer: COMMERCIAL

## 2025-07-02 DIAGNOSIS — F41.1 GENERALIZED ANXIETY DISORDER: Primary | ICD-10-CM

## 2025-07-02 PROCEDURE — 90837 PSYTX W PT 60 MINUTES: CPT

## 2025-07-02 NOTE — PSYCH
Behavioral Health Psychotherapy Progress Note    Psychotherapy Provided: Individual Psychotherapy     1. Generalized anxiety disorder            Goals addressed in session: Goal 1, Goal 2, and Goal 3      DATA: Monica arrived for her appointment a few minutes late because she came from work. Last week she worked at the [employer]. She said it was a physically and emotionally draining week. 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 Ended up working with a child who expressed some serious social issues at home. Monica contacted her supervisor and they made a call to [place] together. Child was already in the system and had a  assigned. Monica found the situation upsetting but was glad that she was able to handle it and support the child through it. Final concert went well. She is very proud of her classroom management skills. Turned 20 on 6/30. Had her first legal drink at dinner with her family. Going to her friend, Franko, edgar Spyra for the holiday weekend. Sleep schedule is getting better. Need to drink more water. Working at [employer] from M-Th from 8:30AM-12 PM. She loves her job. Needs to contact her  about switching her major and what she needs to do to graduate on time. Promised she would contact him this afternoon. She and her father have been arguing. He still doesn't know about her wanting to changer her major. Family is concerned about immigration because they have undocumented family and friends. Monica does worry about being  and carries a copy of her birth certificate with her. She was able to visit briefly with Dr. Osman before leaving. Will see in two weeks.  During this session, this clinician used the following therapeutic modalities: Client-centered Therapy and Supportive Psychotherapy    Substance Abuse was not addressed during this session. If the client is diagnosed with a  "co-occurring substance use disorder, please indicate any changes in the frequency or amount of use: N/A. Stage of change for addressing substance use diagnoses: No substance use/Not applicable    ASSESSMENT:  Monica Townsend presents with a Euthymic/ normal mood.     her affect is Normal range and intensity, which is congruent, with her mood and the content of the session. The client has made progress on their goals.     Monica Townsend presents with a none risk of suicide, none risk of self-harm, and none risk of harm to others.    For any risk assessment that surpasses a \"low\" rating, a safety plan must be developed.    A safety plan was indicated: no  If yes, describe in detail Monica denies current SI/HI/SIB. Can talk to her parents, older brother or friends if thoughts arise. Aware of [employer].    PLAN: Between sessions, Monica Townsend will contact her  about what she needs to do to change her major and still graduate on time. Take care of her physical health. Enjoy time with friends. At the next session, the therapist will use Client-centered Therapy and Supportive Psychotherapy to address anxiety and plans for senior year of college.    Behavioral Health Treatment Plan and Discharge Planning: Monica Townsend is aware of and agrees to continue to work on their treatment plan. They have identified and are working toward their discharge goals. yes    Depression Follow-up Plan Completed: Not applicable    Visit start and stop times:    07/02/25  Start Time: 1407  Stop Time: 1501  Total Visit Time: 54 minutes  "

## 2025-07-14 ENCOUNTER — SOCIAL WORK (OUTPATIENT)
Dept: PSYCHIATRY | Facility: CLINIC | Age: 20
End: 2025-07-14
Payer: COMMERCIAL

## 2025-07-14 DIAGNOSIS — F41.9 ANXIETY: Primary | ICD-10-CM

## 2025-07-14 PROCEDURE — 90837 PSYTX W PT 60 MINUTES: CPT

## 2025-07-14 NOTE — PSYCH
Behavioral Health Psychotherapy Progress Note    Psychotherapy Provided: Individual Psychotherapy     1. Anxiety            Goals addressed in session: Goal 1, Goal 2, and Goal 3      DATA: Monica arrived from her job at [employer]. She recently found out that she is getting an $11/hour raise and will now be getting paid $26/hour. She is so excited. 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 She hasn't reached out to her professor yet about changing her major. She is procrastinating and scared of what he might say and the possibility that she might not be able to graduate on time. Helped her craft an email to her professor that she sent during session. Monica expressed that she has been home too long. Her whole family is on top of each other and are starting to argue. She is going to her college apartment this weekend to get away. She has been spending time with friends. Monica arranged and paid for a place at the beach for her entire family to go away together in August. Therapist praised her for doing that. She said that her whole family hasn't been away together for years. It's right before she returns to college. Talked a little about her self image as she has gained some weight. Talked about how she used to be so thin. Therapist reminded her that she also used to be so sick and on dialysis. She knows that. Says that she and her friends have all different body types. She would like to lose some weight. Discussed that the focus should be health and whatever weight she is. She is looking forward to returning to college and graduating. Not sure what she will do after graduation. Maybe look for a job with [employer] in another location. Will see in two weeks.  During this session, this clinician used the following therapeutic modalities: Client-centered Therapy and Supportive Psychotherapy    Substance Abuse was not addressed during this session. If the client is diagnosed with a co-occurring substance use  "disorder, please indicate any changes in the frequency or amount of use: N/A. Stage of change for addressing substance use diagnoses: No substance use/Not applicable    ASSESSMENT:  Monica Townsend presents with a Euthymic/ normal mood.     her affect is Normal range and intensity, which is congruent, with her mood and the content of the session. The client has made progress on their goals.     Monica Townsend presents with a none risk of suicide, none risk of self-harm, and none risk of harm to others.    For any risk assessment that surpasses a \"low\" rating, a safety plan must be developed.    A safety plan was indicated: no  If yes, describe in detail Monica denies current SI/HI/SIB. Can speak with her parents, older brother or friends if thoughts rosales. Aware of 988 hotline.    PLAN: Between sessions, Monica Townsend will wait for a response form her professor about changing her major and what she needs to do next. Get out of the family house for a little bit and take some time to herself. At the next session, the therapist will use Client-centered Therapy and Supportive Psychotherapy to address anxiety, changing her major and increasing independence.    Behavioral Health Treatment Plan and Discharge Planning: Monica Townsend is aware of and agrees to continue to work on their treatment plan. They have identified and are working toward their discharge goals. yes    Depression Follow-up Plan Completed: Yes    Visit start and stop times:    07/14/25  Start Time: 1402  Stop Time: 1456  Total Visit Time: 54 minutes  "

## 2025-07-29 ENCOUNTER — SOCIAL WORK (OUTPATIENT)
Dept: PSYCHIATRY | Facility: CLINIC | Age: 20
End: 2025-07-29
Payer: COMMERCIAL

## 2025-07-29 DIAGNOSIS — F41.1 GENERALIZED ANXIETY DISORDER: Primary | ICD-10-CM

## 2025-07-29 PROCEDURE — 90837 PSYTX W PT 60 MINUTES: CPT

## 2025-08-08 ENCOUNTER — APPOINTMENT (OUTPATIENT)
Dept: LAB | Facility: HOSPITAL | Age: 20
End: 2025-08-08
Payer: COMMERCIAL

## 2025-08-13 ENCOUNTER — TELEMEDICINE (OUTPATIENT)
Dept: PSYCHIATRY | Facility: CLINIC | Age: 20
End: 2025-08-13
Payer: COMMERCIAL